# Patient Record
Sex: FEMALE | Race: WHITE | NOT HISPANIC OR LATINO | Employment: OTHER | URBAN - METROPOLITAN AREA
[De-identification: names, ages, dates, MRNs, and addresses within clinical notes are randomized per-mention and may not be internally consistent; named-entity substitution may affect disease eponyms.]

---

## 2017-01-02 DIAGNOSIS — E11.9 CONTROLLED TYPE 2 DIABETES MELLITUS WITHOUT COMPLICATION, UNSPECIFIED LONG TERM INSULIN USE STATUS: Primary | ICD-10-CM

## 2017-01-03 DIAGNOSIS — E11.9 CONTROLLED TYPE 2 DIABETES MELLITUS WITHOUT COMPLICATION, UNSPECIFIED LONG TERM INSULIN USE STATUS: ICD-10-CM

## 2017-01-03 RX ORDER — BLOOD-GLUCOSE METER
KIT MISCELLANEOUS
Qty: 150 EACH | Refills: 11 | Status: SHIPPED | OUTPATIENT
Start: 2017-01-03 | End: 2017-01-03 | Stop reason: SDUPTHER

## 2017-01-03 RX ORDER — BLOOD-GLUCOSE METER
KIT MISCELLANEOUS
Qty: 150 EACH | Refills: 5 | Status: SHIPPED | OUTPATIENT
Start: 2017-01-03 | End: 2018-03-13 | Stop reason: SDUPTHER

## 2017-01-20 ENCOUNTER — TELEPHONE (OUTPATIENT)
Dept: INTERNAL MEDICINE | Facility: CLINIC | Age: 77
End: 2017-01-20

## 2017-01-20 NOTE — TELEPHONE ENCOUNTER
I called Traci Morris to ask if blood sugars returned to normal today and was told by Deborah that Marisa was busy with a patient and that she assumes everything is normal or she would have called back.  Deborah was advised to tell Marisa I called back and if blood sugars continue to run high to call our office back  She voiced understanding

## 2017-01-20 NOTE — TELEPHONE ENCOUNTER
----- Message from Ana Trinidad sent at 1/20/2017 12:01 PM EST -----  Contact: ARTURO [facility]  RE: BLOOD GLUCOSE     ARTURO HAS FAXED WEEKLY FASTING LOG    TODAY, HER BLOOD GLUCOSE  AT LUNCH, SHE RECEIVED 17 UNITS OF HUMALOG BEFORE LUNCH, ARTURO WILL RECHECK AFTER LUNCH.    CALL BACK #[phone]

## 2017-04-21 ENCOUNTER — OFFICE VISIT (OUTPATIENT)
Dept: ENDOCRINOLOGY | Facility: CLINIC | Age: 77
End: 2017-04-21

## 2017-04-21 VITALS
BODY MASS INDEX: 23.21 KG/M2 | OXYGEN SATURATION: 98 % | WEIGHT: 131 LBS | HEIGHT: 63 IN | SYSTOLIC BLOOD PRESSURE: 122 MMHG | DIASTOLIC BLOOD PRESSURE: 64 MMHG | HEART RATE: 90 BPM

## 2017-04-21 DIAGNOSIS — I10 BENIGN ESSENTIAL HYPERTENSION: Chronic | ICD-10-CM

## 2017-04-21 DIAGNOSIS — G30.1 LATE ONSET ALZHEIMER'S DISEASE WITHOUT BEHAVIORAL DISTURBANCE (HCC): ICD-10-CM

## 2017-04-21 DIAGNOSIS — F02.80 LATE ONSET ALZHEIMER'S DISEASE WITHOUT BEHAVIORAL DISTURBANCE (HCC): ICD-10-CM

## 2017-04-21 DIAGNOSIS — IMO0001 UNCONTROLLED DIABETES MELLITUS TYPE 2 WITHOUT COMPLICATIONS, UNSPECIFIED LONG TERM INSULIN USE STATUS: Primary | Chronic | ICD-10-CM

## 2017-04-21 DIAGNOSIS — E78.2 MIXED HYPERLIPIDEMIA: Chronic | ICD-10-CM

## 2017-04-21 LAB
GLUCOSE BLDC GLUCOMTR-MCNC: 286 MG/DL (ref 70–130)
HBA1C MFR BLD: 9.1 %

## 2017-04-21 PROCEDURE — 82947 ASSAY GLUCOSE BLOOD QUANT: CPT | Performed by: INTERNAL MEDICINE

## 2017-04-21 PROCEDURE — 83036 HEMOGLOBIN GLYCOSYLATED A1C: CPT | Performed by: INTERNAL MEDICINE

## 2017-04-21 PROCEDURE — 99214 OFFICE O/P EST MOD 30 MIN: CPT | Performed by: INTERNAL MEDICINE

## 2017-04-21 NOTE — PROGRESS NOTES
Faustino Lopez 77 y.o.  CC:Follow-up; Diabetes (TYpe II); Hypertension; and Memory Loss    [place]: Follow-up; Diabetes (TYpe II); Hypertension; and Memory Loss    Blood sugar and 90 day average sugar reviewed  Results for orders placed or performed in visit on 04/21/17   POC Glycosylated Hemoglobin (Hb A1C)   Result Value Ref Range    Hemoglobin A1C 9.1 %   POC Glucose Fingerstick   Result Value Ref Range    Glucose 286 (A) 70 - 130 mg/dL     Average sugar is higher and 8 pm sugars are high as well   Discussed with daughter- she is not having low sugars  Has advanced dementia- in nursing home  Pp high sugars after dinner are likely causing higher morning and average sugars  No recent eye exam due to being in an institution   Ur alb not done     Allergies   Allergen Reactions   • Erythromycin Hives     All Mycins.  Erythromycin Derivatives.       Current Outpatient Prescriptions:   •  ANTI-DIARRHEAL 2 MG tablet, Take 1 tablet by mouth 3 (Three) Times a Day As Needed., Disp: , Rfl: 5  •  Cholecalciferol (VITAMIN D3) 2000 UNITS tablet, Take 1 tablet by mouth., Disp: , Rfl:   •  Cinnamon 500 MG capsule, Take  by mouth., Disp: , Rfl:   •  Cinnamon 500 MG capsule, Take 2 capsules by mouth 2 (Two) Times a Day., Disp: , Rfl: 6  •  clotrimazole-betamethasone (LOTRISONE) 1-0.05 % cream, , Disp: , Rfl:   •  donepezil (ARICEPT) 10 MG tablet, Take  by mouth., Disp: , Rfl:   •  doxycycline (VIBRAMYCIN) 100 MG capsule, Take  by mouth., Disp: , Rfl:   •  EASY TOUCH LANCETS 30G misc, , Disp: , Rfl:   •  EASYMAX TEST test strip, USE TO TEST BLOOD SUGAR FOUR TIMES A DAY *PHYSICIAN ORDER EXPIRES 2/26/15, Disp: 150 each, Rfl: 5  •  EASYTEST LANCETS misc, Test blood sugar QID or as directed, Disp: 150 each, Rfl: 5  •  glimepiride (AMARYL) 4 MG tablet, Take  by mouth 2 (two) times a day., Disp: , Rfl:   •  glimepiride (AMARYL) 4 MG tablet, Take 4 mg by mouth 2 times daily.  , Disp: , Rfl:   •  HUMALOG 100 UNIT/ML injection, INJECT 10  "UNITS SUBCUTANEOUSLY EVERY DAY, 12 UNITS AT LUNCH, AND 16 UNITS AT BEDTIME +111 USE SLIDING SCALE AT MEALS ONLY, Disp: 10 mL, Rfl: 3  •  HYDROcodone-acetaminophen (NORCO) 5-325 MG per tablet, Take  by mouth., Disp: , Rfl:   •  Insulin Glargine (LANTUS SOLOSTAR) 100 UNIT/ML injection pen, Inject  under the skin., Disp: , Rfl:   •  Insulin Pen Needle (B-D UF III MINI PEN NEEDLES) 31G X 5 MM misc, , Disp: , Rfl:   •  Insulin Pen Needle (EASY TOUCH PEN NEEDLES) 31G X 5 MM misc, , Disp: , Rfl:   •  Insulin Syringe (EASY TOUCH INSULIN SYRINGE) 29G X 1/2\" 1 ML misc, , Disp: , Rfl:   •  lisinopril (PRINIVIL,ZESTRIL) 5 MG tablet, Take  by mouth daily., Disp: , Rfl:   •  loratadine (CLARITIN) 10 MG tablet, Take  by mouth., Disp: , Rfl:   •  memantine (NAMENDA XR) 28 MG capsule sustained-release 24 hr extended release capsule, Take  by mouth daily., Disp: , Rfl:   •  metFORMIN (GLUCOPHAGE) 1000 MG tablet, Take 1 tablet by mouth 2 (Two) Times a Day., Disp: 60 tablet, Rfl: 11  •  Multiple Vitamin tablet, Take  by mouth., Disp: , Rfl:   •  Multiple Vitamins-Minerals (PRESERVISION AREDS 2) capsule, Take  by mouth., Disp: , Rfl:   •  mupirocin (BACTROBAN) 2 % ointment, Apply  topically., Disp: , Rfl:   •  NON-ASPIRIN PAIN RELIEF 325 MG tablet, Take 2 tablets by mouth 4 (Four) Times a Day As Needed., Disp: , Rfl: 5  •  Omega-3 Fatty Acids (FISH OIL) 1000 MG capsule capsule, Take  by mouth., Disp: , Rfl:   •  pioglitazone (ACTOS) 45 MG tablet, , Disp: , Rfl:   •  pseudoephedrine (SUDAFED) 30 MG tablet, Take 30 mg by mouth as needed.  , Disp: , Rfl:   •  raloxifene (EVISTA) 60 MG tablet, Take  by mouth daily., Disp: , Rfl:   •  saccharomyces boulardii (FLORASTOR) 250 MG capsule, Take 1 capsule by mouth daily., Disp: , Rfl:   •  sertraline (ZOLOFT) 50 MG tablet, Take 150mg daily, Disp: 90 tablet, Rfl: 5  Patient Active Problem List    Diagnosis   • History of macular degeneration [Z86.69]   • History of migraine [Z86.69]   • Seasonal " allergies [J30.2]     Overview Note:     13 Apr 2015  on claritin     • Alzheimer's disease [G30.9]     Overview Note:     Impression: 04/25/2016 - stable  Impression: 04/13/2015 - at [facility]  seeing Dr Evans  Impression: 06/04/2014 - jose rafael 5/14;      • Benign colonic polyp [K63.5]   • Benign essential hypertension [I10]     Overview Note:     Impression: 04/25/2016 - bp is good  Impression: 11/19/2015 - bp is good  Impression: 01/07/2015 - bp is good  Impression: 07/03/2014 - bp is high- add amlodipine 5 mg daily;      • Chronic lymphocytic leukemia [C91.10]     Overview Note:     Impression: 04/13/2015 - check cbc;      • Depression [F32.9]   • Uncontrolled type 2 diabetes mellitus [E11.65]     Overview Note:     Impression: 04/25/2016 - check cmp on metformin   hgn a1c 8.4%   no change recommended   occ low- is holding am insulin    ok but will update [facility]  Impression: 11/19/2015 - blood sugar is 260 now pp   hgn a1c 7.6%  is up to date with foot care   we are reviewing sugars every 2-3 weeks   and adjusting her insulin  Impression: 04/13/2015 - blood sugar is 118.72, hgn a1c is 9.2% (average 210)  is getting blood sugar testing 4 times daily  we just raised humalog   no neuropathy  checking 4 times a day at NH  change order to be before meals and at bedtime  ok podiatry to see  Impression: 01/07/2015 - blood sugar and 90 day average sugar are higher.  check blood sugars ac and hs and ssi written   better blood sugars overall with titration of long acting insulin   added back up ssi prn ac only- no hs coverage  discussed blood sugar 330 and hgn a1c 9.5%  f/u 3-4 months  Impression: 07/03/2014 - blood sugar 279 after cereal, discussed adding protein to each meal, hgn a1c 8.2% (improved) - average 180  is up to date with eye exam, no neuropathy and neg ur alb.  update [facility]  Impression: 04/28/2014 - blood sugar is 196, hgn a1c 9.1% (average 210)  eye exam shows macular degeneration  no neuropathy  ur  alb neg - reviewed outside lab work with normal creatinine.  discussed options for treatment and would like for her to d/c actos, take metformin 1000 mg +amaryl 4 mg twice daily and add lantus 10 u daily to regimen    4/25/2016:  blood sugar 220 and hgn a1c 8.4%   bp is good   appetite good but not getting am insulin - is sleeping til noon and was having low sugars with am insulin   has several skin lesions right shoulder and wrist- neurodermatitis   memory is stable   discussed blood sugars and average sugar, goals based on cognitive dysfunction and currently I feel like blood sugar is in acceptable range in light of this   rare low sugar    she will continue to follow diet and work on weight loss. check blood sugars fasting and before dinner, email these weekly.  f/u 8 weeks.;      • Diarrhea [R19.7]     Overview Note:     Impression: 07/03/2014 - refer GI  use immodium  some incontinence, nocturnal diarrhea;      • Hyperlipidemia [E78.5]     Overview Note:     Impression: 11/19/2015 - update 4/16 - annually  has had flu shot   daughter will check on pneumonia shot - recc discussed  Impression: 04/13/2015 - check flp  Impression: 07/03/2014 - check flp  Impression: 04/28/2014 - check next ov.;      • Irritable bowel syndrome [K58.9]     Overview Note:     Impression: 07/03/2014 - refer to GI, some cramping and incontinence - not sure when last colonoscopy was, h/o food allergies  check celiac titers, refer to GI;    03 Jul 2014  refer to GI, some cramping and incontinence - not sure      when last colonoscopy was, h/o food allergiescheck celiac titers, refer to GI  Kesha Waller (Endocrinology)     • Cramps of lower extremity [R25.2]   • Leukocytosis [D72.829]     Overview Note:     Impression: 04/13/2015 - check cbc;      • Memory loss [R41.3]     Overview Note:     Loss of memory     • Neurodermatitis [L28.0]     Overview Note:     Impression: 04/25/2016 - NH is watching- may be due to CLL   keep  covered, neosporin prn;      • Seasonal allergic rhinitis [J30.2]     Overview Note:     Impression: 04/13/2015 - on claritin;      • Vitamin D deficiency [E55.9]     Overview Note:     Impression: 04/13/2015 - update vitamin D levels  Impression: 07/03/2014 - update level;      • Fibrocystic breast changes [N60.19]   • Encounter for screening mammogram for malignant neoplasm of breast [Z12.31]   • Mammographic microcalcification [R92.0]     Review of Systems   Constitutional: Negative for activity change, appetite change, chills, diaphoresis, fatigue, fever and unexpected weight change.   HENT: Negative for congestion, dental problem, drooling, ear discharge, ear pain, facial swelling, hearing loss, mouth sores, nosebleeds, postnasal drip, rhinorrhea, sinus pressure, sneezing, sore throat, tinnitus, trouble swallowing and voice change.    Eyes: Negative for photophobia, pain, discharge, redness, itching and visual disturbance.   Respiratory: Negative for apnea, cough, choking, chest tightness, shortness of breath, wheezing and stridor.    Cardiovascular: Negative for chest pain, palpitations and leg swelling.   Gastrointestinal: Negative for abdominal distention, abdominal pain, anal bleeding, blood in stool, constipation, diarrhea, nausea, rectal pain and vomiting.   Endocrine: Negative for cold intolerance, heat intolerance, polydipsia, polyphagia and polyuria.   Genitourinary: Negative for decreased urine volume, difficulty urinating, dysuria, enuresis, flank pain, frequency, genital sores, hematuria and urgency.   Musculoskeletal: Negative for arthralgias, back pain, gait problem, joint swelling, myalgias, neck pain and neck stiffness.   Skin: Negative for color change, pallor, rash and wound.   Allergic/Immunologic: Negative for environmental allergies, food allergies and immunocompromised state.   Neurological: Negative for dizziness, tremors, seizures, syncope, facial asymmetry, speech difficulty, weakness,  "light-headedness, numbness and headaches.   Hematological: Negative for adenopathy. Does not bruise/bleed easily.   Psychiatric/Behavioral: Positive for confusion. Negative for agitation, behavioral problems, decreased concentration, dysphoric mood, hallucinations, self-injury, sleep disturbance and suicidal ideas. The patient is not nervous/anxious and is not hyperactive.         Memory loss     Social History     Social History   • Marital status:      Spouse name: N/A   • Number of children: N/A   • Years of education: N/A     Occupational History   • Not on file.     Social History Main Topics   • Smoking status: Never Smoker   • Smokeless tobacco: Not on file   • Alcohol use No   • Drug use: No   • Sexual activity: Defer     Other Topics Concern   • Not on file     Social History Narrative     Family History   Problem Relation Age of Onset   • Diabetes Father    • Migraines Father      Migraine headaches   • Diabetes Daughter    • Hyperlipidemia Daughter      /64  Pulse 90  Ht 63\" (160 cm)  Wt 131 lb (59.4 kg)  SpO2 98%  BMI 23.21 kg/m2  Physical Exam   Constitutional: She is oriented to person, place, and time. She appears well-developed and well-nourished.   HENT:   Head: Normocephalic and atraumatic.   Nose: Nose normal.   Mouth/Throat: Oropharynx is clear and moist.   Eyes: Conjunctivae, EOM and lids are normal. Pupils are equal, round, and reactive to light.   Neck: Trachea normal and normal range of motion. Neck supple. Carotid bruit is not present. No tracheal deviation present. No thyroid mass and no thyromegaly present.   Cardiovascular: Normal rate, regular rhythm, normal heart sounds and intact distal pulses.  Exam reveals no gallop and no friction rub.    No murmur heard.  Pulmonary/Chest: Effort normal and breath sounds normal. No respiratory distress. She has no wheezes.   Musculoskeletal: Normal range of motion. She exhibits no edema or deformity.       Neurological Sensory " Findings - Unaltered hot/cold right ankle/foot discrimination and unaltered hot/cold left ankle/foot discrimination. Unaltered sharp/dull right ankle/foot discrimination and unaltered sharp/dull left ankle/foot discrimination. No right ankle/foot altered proprioception and no left ankle/foot altered proprioception    Vascular Status -  Her exam exhibits right foot vasculature normal. Her exam exhibits no right foot edema. Her exam exhibits left foot vasculature normal. Her exam exhibits no left foot edema.   Skin Integrity  -  Her right foot skin is intact.     Faustino 's left foot skin is intact. .  Lymphadenopathy:     She has no cervical adenopathy.   Neurological: She is alert and oriented to person, place, and time. She has normal reflexes. She displays normal reflexes. No cranial nerve deficit.   Skin: Skin is warm and dry. No rash noted. No cyanosis or erythema. Nails show no clubbing.   Psychiatric: She has a normal mood and affect. Her speech is normal and behavior is normal. Judgment and thought content normal. Cognition and memory are normal.   Nursing note and vitals reviewed.    Results for orders placed or performed in visit on 10/17/16   POC Glucose Fingerstick   Result Value Ref Range    Glucose 305 (A) 70 - 130 mg/dL   POC Glycosylated Hemoglobin (Hb A1C)   Result Value Ref Range    Hemoglobin A1C 8.5 %     Faustino was seen today for follow-up, diabetes, hypertension and memory loss.    Diagnoses and all orders for this visit:    Uncontrolled diabetes mellitus type 2 without complications, unspecified long term insulin use status  -     POC Glycosylated Hemoglobin (Hb A1C)  -     POC Glucose Fingerstick      Problem List Items Addressed This Visit        Cardiovascular and Mediastinum    Benign essential hypertension (Chronic)     bp is good  No changes- on lisinopril  Check lab next ov          Hyperlipidemia (Chronic)     Update lipids at next visit            Endocrine    Uncontrolled type 2  diabetes mellitus - Primary (Chronic)     Blood sugar and 90 day average sugar reviewed  Results for orders placed or performed in visit on 04/21/17   POC Glycosylated Hemoglobin (Hb A1C)   Result Value Ref Range    Hemoglobin A1C 9.1 %   POC Glucose Fingerstick   Result Value Ref Range    Glucose 286 (A) 70 - 130 mg/dL     Average sugar is higher 210 or so  She is not up to date with preventive care- is living in nursing home setting  Goal a1c 7-8 %   Based on higher pp dinner sugars, would increase predinner humalog to 22 units  email sugars weekly   Continue testing ac and hs         Relevant Orders    POC Glycosylated Hemoglobin (Hb A1C) (Completed)    POC Glucose Fingerstick (Completed)       Nervous and Auditory    Late onset Alzheimer's disease without behavioral disturbance     Stable symptoms currently              Return in about 4 months (around 8/21/2017) for Recheck 30 min .    Maryam Steele MA

## 2017-05-05 RX ORDER — SYRINGE,NEEDLE,INSULN,SAFE,1ML 29 G X1/2"
SYRINGE, EMPTY DISPOSABLE MISCELLANEOUS
Qty: 100 EACH | Refills: 5 | Status: SHIPPED | OUTPATIENT
Start: 2017-05-05 | End: 2018-05-22 | Stop reason: SDUPTHER

## 2017-05-09 RX ORDER — MEMANTINE HYDROCHLORIDE 28 MG/1
CAPSULE, EXTENDED RELEASE ORAL
Qty: 30 CAPSULE | Refills: 6 | Status: SHIPPED | OUTPATIENT
Start: 2017-05-09 | End: 2017-06-30

## 2017-06-02 RX ORDER — LANCETS 30 GAUGE
EACH MISCELLANEOUS
Qty: 150 EACH | Refills: 5 | Status: SHIPPED | OUTPATIENT
Start: 2017-06-02 | End: 2017-06-05 | Stop reason: SDUPTHER

## 2017-06-05 RX ORDER — LANCETS 30 GAUGE
EACH MISCELLANEOUS
Qty: 100 EACH | Refills: 5 | Status: ON HOLD | OUTPATIENT
Start: 2017-06-05 | End: 2019-03-28

## 2017-06-06 RX ORDER — SERTRALINE HYDROCHLORIDE 100 MG/1
TABLET, FILM COATED ORAL
Qty: 45 TABLET | Refills: 5 | Status: SHIPPED | OUTPATIENT
Start: 2017-06-06 | End: 2018-01-16 | Stop reason: SDUPTHER

## 2017-06-29 ENCOUNTER — OFFICE VISIT (OUTPATIENT)
Dept: NEUROLOGY | Facility: CLINIC | Age: 77
End: 2017-06-29

## 2017-06-29 VITALS
WEIGHT: 131 LBS | SYSTOLIC BLOOD PRESSURE: 138 MMHG | HEIGHT: 63 IN | BODY MASS INDEX: 23.21 KG/M2 | DIASTOLIC BLOOD PRESSURE: 82 MMHG

## 2017-06-29 DIAGNOSIS — F02.80 LATE ONSET ALZHEIMER'S DISEASE WITHOUT BEHAVIORAL DISTURBANCE (HCC): Primary | ICD-10-CM

## 2017-06-29 DIAGNOSIS — G30.1 LATE ONSET ALZHEIMER'S DISEASE WITHOUT BEHAVIORAL DISTURBANCE (HCC): Primary | ICD-10-CM

## 2017-06-29 PROCEDURE — 99214 OFFICE O/P EST MOD 30 MIN: CPT | Performed by: PHYSICIAN ASSISTANT

## 2017-06-29 NOTE — PROGRESS NOTES
Subjective     History of Present Illness   Pt originally seen 6/3/14 for memory loss of about a year. Not noticed by patient. Dtr reported trouble with judgment, short term memory asking repetitive questions and making repetitive statements. Forgetting to pay bills --dtr had to take over Lost a valuable check and didn't remember she'd received it. Also more trouble with learning how to use new appliances, organizing and planning, taking meds correctly (dtr now supervising meds, particularly insulin). Trouble finding her way back to table at a restaurant. Doesn't cook much -- eats out. Uses microwave.  Personality more withdrawn.    Normal B12, folate, TFTs, CT head. Started donepezil, then further worsening, had to stop driving, added Namenda and Lexapro. Noted need for 24 hr supervision and moved to [facility].  Nan reported dhe feels essentially unchanged cognitively and her daughter agreed. Her daughter continues to note that she is picking at her skin. She has also developed urinary and fecal incontinence. On 10mg daily, Namenda, and Lexapro 10mg daily. MMSE was 18/30, maybe influenced by visual impairment 2/2 macular degeneration. Planned increase of sertraline for picking at sores.   Today dtr notes continued problems with memory, judgment, WF, planning/organizing and orientation, but no difficulty with delusions or hallucinations. Also notes pt having more difficulty walking, more shuffling, and balance not as good. Pt reports feeling fine.  Picking at sores again -- increased dose worked well until past month or two. Sleeping more past few months. Continued problems with bowel incontinence. Recent check of UA. Appetite OK. Had skin biopsies. No longer getting mammograms.    Today: She feels essentially unchanged since she was last seen in 12/17. Her family notes a continued cognitive and physical decline. Her balance has become increasingly impaired.       The following portions of the patient's history  "were reviewed and updated as appropriate: allergies, current medications, past family history, past medical history, past social history, past surgical history and problem list.    Review of Systems   Constitutional: Negative.    HENT: Negative.    Eyes: Negative.    Respiratory: Negative.    Cardiovascular: Negative.    Gastrointestinal: Negative.    Endocrine: Negative.    Genitourinary: Negative.    Musculoskeletal: Negative.    Skin: Negative.    Allergic/Immunologic: Negative.    Neurological:        As noted in HPI   Hematological: Negative.    Psychiatric/Behavioral: Negative.        Objective     /82  Ht 63\" (160 cm)  Wt 131 lb (59.4 kg)  BMI 23.21 kg/m2    General appearance today is normal.         Physical Exam   Neurological: She has normal strength. She has a normal Finger-Nose-Finger Test.   Psychiatric: Her speech is normal.        Neurologic Exam     Mental Status   Oriented to person.   (Oriented to state, city, and floor  )  Disoriented to time. Disoriented to year, month, date, day and season.   Registration: recalls 3 of 3 objects. Recall of objects at 5 minutes: 0/3 recall. Follows 3 step commands.   Attention: 1/5 sequencing.   Speech: speech is normal   Level of consciousness: alert  Able to name object. Able to read. Unable to repeat. Able to write. Normal comprehension.     Cranial Nerves   Cranial nerves II through XII intact.     Motor Exam   Muscle bulk: normal  Overall muscle tone: normal    Strength   Strength 5/5 throughout.     Sensory Exam   Light touch normal.     Gait, Coordination, and Reflexes     Gait  Gait: (wide based)    Coordination   Finger to nose coordination: normal    Tremor   Resting tremor: absent        Results  MMSE=13 (!6 in 12/16)      Assessment/Plan   Faustino was seen today for dementia.    Diagnoses and all orders for this visit:    Late onset Alzheimer's disease without behavioral disturbance          Discussion/Summary   Faustino Lopez returns to " clinic today for evaluation of Alzheimer's Disease . I again reviewed her current status and treatment options. After discussing potential treatment options, it was elected to continue on  memantine and Zoloft unchanged for now. She will then follow up in 6 months, or sooner if needed.       I spent 20 minutes out of 25 minutes face to face with the patient and family and discussing diagnosis, prognosis, evaluation, current status, treatment options and management.    As part of this visit I obtained additional history from the family which is incorporated in the HPI.      Kellen Sandy PA-C

## 2017-06-30 RX ORDER — MEMANTINE HYDROCHLORIDE 10 MG/1
10 TABLET ORAL 2 TIMES DAILY
Qty: 60 TABLET | Refills: 11 | Status: SHIPPED | OUTPATIENT
Start: 2017-06-30 | End: 2018-06-05 | Stop reason: SDUPTHER

## 2017-07-07 ENCOUNTER — TELEPHONE (OUTPATIENT)
Dept: NEUROLOGY | Facility: CLINIC | Age: 77
End: 2017-07-07

## 2017-07-07 NOTE — TELEPHONE ENCOUNTER
It was most likely due to cost. I am not opposed to her being the Namenda XR if the cost is the same as the generic (10mg BID).

## 2017-07-07 NOTE — TELEPHONE ENCOUNTER
Pharmacy called  and wanted to double check medication.  States pt has been taking the Namenda XR and now the 10 mg was called in. They wanted to check which one you wanted PT should be currently taking and if the 10mg why the change? Please advise       [phone]

## 2017-08-18 ENCOUNTER — OFFICE VISIT (OUTPATIENT)
Dept: ENDOCRINOLOGY | Facility: CLINIC | Age: 77
End: 2017-08-18

## 2017-08-18 VITALS
OXYGEN SATURATION: 94 % | SYSTOLIC BLOOD PRESSURE: 118 MMHG | HEIGHT: 63 IN | WEIGHT: 131 LBS | DIASTOLIC BLOOD PRESSURE: 60 MMHG | HEART RATE: 85 BPM | BODY MASS INDEX: 23.21 KG/M2

## 2017-08-18 DIAGNOSIS — IMO0001 UNCONTROLLED DIABETES MELLITUS TYPE 2 WITHOUT COMPLICATIONS, UNSPECIFIED LONG TERM INSULIN USE STATUS: Primary | Chronic | ICD-10-CM

## 2017-08-18 DIAGNOSIS — E78.2 MIXED HYPERLIPIDEMIA: Chronic | ICD-10-CM

## 2017-08-18 DIAGNOSIS — I10 BENIGN ESSENTIAL HYPERTENSION: Chronic | ICD-10-CM

## 2017-08-18 DIAGNOSIS — E03.9 ACQUIRED HYPOTHYROIDISM: ICD-10-CM

## 2017-08-18 DIAGNOSIS — E55.9 VITAMIN D DEFICIENCY: ICD-10-CM

## 2017-08-18 DIAGNOSIS — C91.10 CHRONIC LYMPHOCYTIC LEUKEMIA (HCC): Chronic | ICD-10-CM

## 2017-08-18 LAB
GLUCOSE BLDC GLUCOMTR-MCNC: 323 MG/DL (ref 70–130)
HBA1C MFR BLD: 8.7 %

## 2017-08-18 PROCEDURE — 83036 HEMOGLOBIN GLYCOSYLATED A1C: CPT | Performed by: INTERNAL MEDICINE

## 2017-08-18 PROCEDURE — 99214 OFFICE O/P EST MOD 30 MIN: CPT | Performed by: INTERNAL MEDICINE

## 2017-08-18 PROCEDURE — 82947 ASSAY GLUCOSE BLOOD QUANT: CPT | Performed by: INTERNAL MEDICINE

## 2017-08-18 RX ORDER — PEN NEEDLE, DIABETIC, SAFETY 30 GX3/16"
NEEDLE, DISPOSABLE MISCELLANEOUS
Refills: 0 | COMMUNITY
Start: 2017-06-07

## 2017-08-18 RX ORDER — MEMANTINE HYDROCHLORIDE 28 MG/1
CAPSULE, EXTENDED RELEASE ORAL
Refills: 7 | COMMUNITY
Start: 2017-07-11 | End: 2019-03-29 | Stop reason: HOSPADM

## 2017-08-18 RX ORDER — LEVOTHYROXINE SODIUM 0.03 MG/1
TABLET ORAL
COMMUNITY
Start: 2017-07-31

## 2017-08-18 RX ORDER — ATORVASTATIN CALCIUM 40 MG/1
TABLET, FILM COATED ORAL
Refills: 6 | COMMUNITY
Start: 2017-07-26 | End: 2017-08-18

## 2017-08-18 NOTE — ASSESSMENT & PLAN NOTE
Nursing home started supplement for tsh 5.9  Discussed that this is actually normal for her age   Recommended repeat tfts on supplement  Send copy here if done

## 2017-08-18 NOTE — PROGRESS NOTES
Faustino Lpoez 77 y.o.  [facility]: Follow-up; Diabetes (Type II, last eye exam was 2 years ago); Hypertension; Memory Loss; and Hypothyroidism (Diagnosed by Nursing Facility and levothyroxine was started January 2017)      [place]: Follow-up; Diabetes (Type II, last eye exam was 2 years ago); Hypertension; Memory Loss; and Hypothyroidism (Diagnosed by Nursing Facility and levothyroxine was started January 2017)    Blood sugar and 90 day average sugar reviewed  Results for orders placed or performed in visit on 08/18/17   POC Glycosylated Hemoglobin (Hb A1C)   Result Value Ref Range    Hemoglobin A1C 8.7 %   POC Glucose Fingerstick   Result Value Ref Range    Glucose 323 (A) 70 - 130 mg/dL     Average sugar is higher - is on amaryl, metformin, humalog and lantus  Nursing home is emailing sugars  Higher sugars assoc with higher carb foods, sweet tea  She is on diabetic diet but aids have problems with using splenda / remembering to give her diet drinks  A lot of transition   Fasting sugar   Pp breakfast 79- 498  Pre lunch   Pre dinner 200- 407 -recheck 290  Discussed with daughter- primary goal with cognitive dysfunction is avoiding low sugars  High wbc - has cll   Also reviewed outside lab work 12/16- tsh high (added thyroid supplement due to tsh of 5.9)  Also NH started statin therapy due to high chol    Allergies   Allergen Reactions   • Erythromycin Hives     All Mycins.  Erythromycin Derivatives.       Current Outpatient Prescriptions:   •  Cholecalciferol (VITAMIN D3) 2000 UNITS tablet, Take 1 tablet by mouth., Disp: , Rfl:   •  Cinnamon 500 MG capsule, Take 2 capsules by mouth 2 (Two) Times a Day., Disp: , Rfl: 6  •  donepezil (ARICEPT) 10 MG tablet, Take  by mouth., Disp: , Rfl:   •  glimepiride (AMARYL) 4 MG tablet, Take  by mouth 2 (two) times a day., Disp: , Rfl:   •  HYDROcodone-acetaminophen (NORCO) 5-325 MG per tablet, Take  by mouth., Disp: , Rfl:   •  lisinopril (PRINIVIL,ZESTRIL) 5 MG tablet,  "Take  by mouth daily., Disp: , Rfl:   •  loratadine (CLARITIN) 10 MG tablet, Take  by mouth., Disp: , Rfl:   •  memantine (NAMENDA) 10 MG tablet, Take 1 tablet by mouth 2 (Two) Times a Day., Disp: 60 tablet, Rfl: 11  •  metFORMIN (GLUCOPHAGE) 1000 MG tablet, Take 1 tablet by mouth 2 (Two) Times a Day., Disp: 60 tablet, Rfl: 11  •  saccharomyces boulardii (FLORASTOR) 250 MG capsule, Take 1 capsule by mouth daily., Disp: , Rfl:   •  sertraline (ZOLOFT) 100 MG tablet, TAKE 1&1/2 TABLETS BY MOUTH AT BEDTIME, Disp: 45 tablet, Rfl: 5  •  ANTI-DIARRHEAL 2 MG tablet, Take 1 tablet by mouth 3 (Three) Times a Day As Needed., Disp: , Rfl: 5  •  BD AUTOSHIELD DUO 30G X 5 MM misc, , Disp: , Rfl: 0  •  clotrimazole-betamethasone (LOTRISONE) 1-0.05 % cream, , Disp: , Rfl:   •  EASY TOUCH INSULIN SAFETY SYR 29G X 1/2\" 1 ML misc, INJECT WITH INSULIN FOUR TIMES A DAY OR AS DIRECTED, Disp: 100 each, Rfl: 5  •  EASY TOUCH LANCETS 30G misc, USE TO TEST BLOOD SUGAR FOUR TIMES A DAY *PHYSICIAN ORDER EXPIRES 2/24/16*, Disp: 100 each, Rfl: 5  •  EASYMAX TEST test strip, USE TO TEST BLOOD SUGAR FOUR TIMES A DAY *PHYSICIAN ORDER EXPIRES 2/26/15, Disp: 150 each, Rfl: 5  •  EASYTEST LANCETS misc, Test blood sugar QID or as directed, Disp: 150 each, Rfl: 5  •  HUMALOG 100 UNIT/ML injection, INJECT 12U SUBCUTANEOUSLY BEFORE BREAKFAST, 14U BEFORE LUNCH AND 22U BEFORE DINNER *DISCARD 28 DAYS AFTER OPENING* (Patient taking differently: INJECT 14U SUBCUTANEOUSLY BEFORE BREAKFAST, 16U BEFORE LUNCH AND 25U BEFORE DINNER *DISCARD 28 DAYS AFTER OPENING*), Disp: 10 mL, Rfl: 5  •  Insulin Glargine (LANTUS SOLOSTAR) 100 UNIT/ML injection pen, Inject  under the skin., Disp: , Rfl:   •  Insulin Pen Needle (B-D UF III MINI PEN NEEDLES) 31G X 5 MM misc, , Disp: , Rfl:   •  Insulin Pen Needle (EASY TOUCH PEN NEEDLES) 31G X 5 MM misc, , Disp: , Rfl:   •  levothyroxine (SYNTHROID, LEVOTHROID) 25 MCG tablet, , Disp: , Rfl:   •  Multiple Vitamin tablet, Take  by " mouth., Disp: , Rfl:   •  Multiple Vitamins-Minerals (PRESERVISION AREDS 2) capsule, Take  by mouth., Disp: , Rfl:   •  NAMENDA XR 28 MG capsule sustained-release 24 hr extended release capsule, , Disp: , Rfl: 7  •  NON-ASPIRIN PAIN RELIEF 325 MG tablet, Take 2 tablets by mouth 4 (Four) Times a Day As Needed., Disp: , Rfl: 5  •  Omega-3 Fatty Acids (FISH OIL) 1000 MG capsule capsule, Take  by mouth., Disp: , Rfl:   •  pseudoephedrine (SUDAFED) 30 MG tablet, Take 30 mg by mouth as needed.  , Disp: , Rfl:   Patient Active Problem List    Diagnosis   • History of macular degeneration [Z86.69]   • History of migraine [Z86.69]   • Seasonal allergies [J30.2]     Overview Note:     13 Apr 2015  on claritin     • Late onset Alzheimer's disease without behavioral disturbance [G30.1, F02.80]     Overview Note:     Impression: 04/25/2016 - stable  Impression: 04/13/2015 - at [facility]  seeing Dr Evans  Impression: 06/04/2014 - jose rafael 5/14;      • Benign colonic polyp [K63.5]   • Benign essential hypertension [I10]     Overview Note:     Impression: 04/25/2016 - bp is good  Impression: 11/19/2015 - bp is good  Impression: 01/07/2015 - bp is good  Impression: 07/03/2014 - bp is high- add amlodipine 5 mg daily;      • Chronic lymphocytic leukemia [C91.10]     Overview Note:     Impression: 04/13/2015 - check cbc;      • Depression [F32.9]   • Uncontrolled type 2 diabetes mellitus [E11.65]     Overview Note:     Impression: 04/25/2016 - check cmp on metformin   hgn a1c 8.4%   no change recommended   occ low- is holding am insulin    ok but will update cmp  Impression: 11/19/2015 - blood sugar is 260 now pp   hgn a1c 7.6%  is up to date with foot care   we are reviewing sugars every 2-3 weeks   and adjusting her insulin  Impression: 04/13/2015 - blood sugar is 118.72, hgn a1c is 9.2% (average 210)  is getting blood sugar testing 4 times daily  we just raised humalog   no neuropathy  checking 4 times a day at NH  change  order to be before meals and at bedtime  ok podiatry to see  Impression: 01/07/2015 - blood sugar and 90 day average sugar are higher.  check blood sugars ac and hs and ssi written   better blood sugars overall with titration of long acting insulin   added back up ssi prn ac only- no hs coverage  discussed blood sugar 330 and hgn a1c 9.5%  f/u 3-4 months  Impression: 07/03/2014 - blood sugar 279 after cereal, discussed adding protein to each meal, hgn a1c 8.2% (improved) - average 180  is up to date with eye exam, no neuropathy and neg ur alb.  update cmp  Impression: 04/28/2014 - blood sugar is 196, hgn a1c 9.1% (average 210)  eye exam shows macular degeneration  no neuropathy  ur alb neg - reviewed outside lab work with normal creatinine.  discussed options for treatment and would like for her to d/c actos, take metformin 1000 mg +amaryl 4 mg twice daily and add lantus 10 u daily to regimen    4/25/2016:  blood sugar 220 and hgn a1c 8.4%   bp is good   appetite good but not getting am insulin - is sleeping til noon and was having low sugars with am insulin   has several skin lesions right shoulder and wrist- neurodermatitis   memory is stable   discussed blood sugars and average sugar, goals based on cognitive dysfunction and currently I feel like blood sugar is in acceptable range in light of this   rare low sugar    she will continue to follow diet and work on weight loss. check blood sugars fasting and before dinner, email these weekly.  f/u 8 weeks.;      • Diarrhea [R19.7]     Overview Note:     Impression: 07/03/2014 - refer GI  use immodium  some incontinence, nocturnal diarrhea;      • Hyperlipidemia [E78.5]     Overview Note:     Impression: 11/19/2015 - update 4/16 - annually  has had flu shot   daughter will check on pneumonia shot - recc discussed  Impression: 04/13/2015 - check flp  Impression: 07/03/2014 - check flp  Impression: 04/28/2014 - check next ov.;      • Irritable bowel syndrome [K58.9]      Overview Note:     Impression: 07/03/2014 - refer to GI, some cramping and incontinence - not sure when last colonoscopy was, h/o food allergies  check celiac titers, refer to GI;    03 Jul 2014  refer to GI, some cramping and incontinence - not sure      when last colonoscopy was, h/o food allergiescheck celiac titers, refer to GI  Kesha Waller (Endocrinology)     • Cramps of lower extremity [R25.2]   • Leukocytosis [D72.829]     Overview Note:     Impression: 04/13/2015 - check cbc;      • Memory loss [R41.3]     Overview Note:     Loss of memory     • Neurodermatitis [L28.0]     Overview Note:     Impression: 04/25/2016 - NH is watching- may be due to CLL   keep covered, neosporin prn;      • Seasonal allergic rhinitis [J30.2]     Overview Note:     Impression: 04/13/2015 - on claritin;      • Vitamin D deficiency [E55.9]     Overview Note:     Impression: 04/13/2015 - update vitamin D levels  Impression: 07/03/2014 - update level;      • Fibrocystic breast changes [N60.19]   • Encounter for screening mammogram for malignant neoplasm of breast [Z12.31]   • Mammographic microcalcification [R92.0]   • Other specified postprocedural states [Z98.890]     Review of Systems   Constitutional: Negative for activity change, appetite change, chills, diaphoresis, fatigue, fever and unexpected weight change.   HENT: Negative for congestion, dental problem, drooling, ear discharge, ear pain, facial swelling, hearing loss, mouth sores, nosebleeds, postnasal drip, rhinorrhea, sinus pressure, sneezing, sore throat, tinnitus, trouble swallowing and voice change.    Eyes: Negative for photophobia, pain, discharge, redness, itching and visual disturbance.   Respiratory: Negative for apnea, cough, choking, chest tightness, shortness of breath, wheezing and stridor.    Cardiovascular: Negative for chest pain, palpitations and leg swelling.   Gastrointestinal: Negative for abdominal distention, abdominal pain, anal  "bleeding, blood in stool, constipation, diarrhea, nausea, rectal pain and vomiting.   Endocrine: Negative for cold intolerance, heat intolerance, polydipsia, polyphagia and polyuria.   Genitourinary: Negative for decreased urine volume, difficulty urinating, dysuria, enuresis, flank pain, frequency, genital sores, hematuria and urgency.   Musculoskeletal: Negative for arthralgias, back pain, gait problem, joint swelling, myalgias, neck pain and neck stiffness.   Skin: Negative for color change, pallor, rash and wound.   Allergic/Immunologic: Negative for environmental allergies, food allergies and immunocompromised state.   Neurological: Negative for dizziness, tremors, seizures, syncope, facial asymmetry, speech difficulty, weakness, light-headedness, numbness and headaches.   Hematological: Negative for adenopathy. Does not bruise/bleed easily.   Psychiatric/Behavioral: Negative for agitation, behavioral problems, confusion, decreased concentration, dysphoric mood, hallucinations, self-injury, sleep disturbance and suicidal ideas. The patient is not nervous/anxious and is not hyperactive.      Social History     Social History   • Marital status:      Spouse name: N/A   • Number of children: N/A   • Years of education: N/A     Occupational History   • Not on file.     Social History Main Topics   • Smoking status: Never Smoker   • Smokeless tobacco: Not on file   • Alcohol use No   • Drug use: No   • Sexual activity: Defer     Other Topics Concern   • Not on file     Social History Narrative     Family History   Problem Relation Age of Onset   • Diabetes Father    • Migraines Father      Migraine headaches   • Diabetes Daughter    • Hyperlipidemia Daughter      /60  Pulse 85  Ht 63\" (160 cm)  Wt 131 lb (59.4 kg)  SpO2 94%  BMI 23.21 kg/m2  Physical Exam   Constitutional: She is oriented to person, place, and time. She appears well-developed and well-nourished.   HENT:   Head: Normocephalic and " atraumatic.   Nose: Nose normal.   Mouth/Throat: Oropharynx is clear and moist.   Eyes: Conjunctivae, EOM and lids are normal. Pupils are equal, round, and reactive to light.   Neck: Trachea normal and normal range of motion. Neck supple. Carotid bruit is not present. No tracheal deviation present. No thyroid mass and no thyromegaly present.   Cardiovascular: Normal rate, regular rhythm, normal heart sounds and intact distal pulses.  Exam reveals no gallop and no friction rub.    No murmur heard.  Pulmonary/Chest: Effort normal and breath sounds normal. No respiratory distress. She has no wheezes.   Musculoskeletal: Normal range of motion. She exhibits no edema or deformity.   Lymphadenopathy:     She has no cervical adenopathy.   Neurological: She is alert and oriented to person, place, and time. She has normal reflexes. She displays normal reflexes. No cranial nerve deficit.   Skin: Skin is warm and dry. No rash noted. No cyanosis or erythema. Nails show no clubbing.   Psychiatric: She has a normal mood and affect. Her speech is normal and behavior is normal. Judgment and thought content normal. Cognition and memory are normal.   Nursing note and vitals reviewed.    Results for orders placed or performed in visit on 04/21/17   POC Glycosylated Hemoglobin (Hb A1C)   Result Value Ref Range    Hemoglobin A1C 9.1 %   POC Glucose Fingerstick   Result Value Ref Range    Glucose 286 (A) 70 - 130 mg/dL     Faustino was seen today for follow-up, diabetes, hypertension and memory loss.    Diagnoses and all orders for this visit:    Uncontrolled diabetes mellitus type 2 without complications, unspecified long term insulin use status  -     POC Glycosylated Hemoglobin (Hb A1C)  -     POC Glucose Fingerstick      Problem List Items Addressed This Visit        Cardiovascular and Mediastinum    Benign essential hypertension (Chronic)     bp is good          Hyperlipidemia (Chronic)     LDL high- is getting statin therapy - lab  work via MD to you             Digestive    Vitamin D deficiency     Continue supplement             Endocrine    Uncontrolled type 2 diabetes mellitus - Primary (Chronic)     Blood sugar and 90 day average sugar reviewed  Results for orders placed or performed in visit on 08/18/17   POC Glycosylated Hemoglobin (Hb A1C)   Result Value Ref Range    Hemoglobin A1C 8.7 %   POC Glucose Fingerstick   Result Value Ref Range    Glucose 323 (A) 70 - 130 mg/dL     Reminded about eye exam but no vision changes  Is at [facility] now with moderate dementia  No foot lesion- nails trimmed x 10   Recommended podiatry at [facility] to maintain foot care  Higher sugars assoc with ice cream and sweet tea- daughter is working with nurses and nurses aids at [facility] to avoid giving her these items when possible  F/u 4-6 months            Relevant Orders    POC Glycosylated Hemoglobin (Hb A1C) (Completed)    POC Glucose Fingerstick (Completed)    Acquired hypothyroidism     Nursing home started supplement for tsh 5.9  Discussed that this is actually normal for her age   Recommended repeat tfts on supplement  Send copy here if done          Relevant Medications    levothyroxine (SYNTHROID, LEVOTHROID) 25 MCG tablet       Hematopoietic and Hemostatic    Chronic lymphocytic leukemia (Chronic)     Wbc 17 k 12/16- reviewed lab work with her              Return in about 6 months (around 2/18/2018) for Recheck 30 min .    Maryam Steele MA  Signed Kesha Waller MD

## 2017-08-18 NOTE — ASSESSMENT & PLAN NOTE
Blood sugar and 90 day average sugar reviewed  Results for orders placed or performed in visit on 08/18/17   POC Glycosylated Hemoglobin (Hb A1C)   Result Value Ref Range    Hemoglobin A1C 8.7 %   POC Glucose Fingerstick   Result Value Ref Range    Glucose 323 (A) 70 - 130 mg/dL     Reminded about eye exam but no vision changes  [facility] now with moderate dementia  No foot lesion- nails trimmed x 10   Recommended podiatry at [facility] to maintain foot care  Higher sugars assoc with ice cream and sweet tea- daughter is working with nurses and nurses aids at [facility] to avoid giving her these items when possible  F/u 4-6 months

## 2017-10-11 ENCOUNTER — TELEPHONE (OUTPATIENT)
Dept: NEUROLOGY | Facility: CLINIC | Age: 77
End: 2017-10-11

## 2017-10-11 NOTE — TELEPHONE ENCOUNTER
"Daughter called and is trying to manage patient's finances but some financial institutions will not accept her POA without a physician statement that patient is 'Incapacitated' and unable to manage her financial affairs.If you believe this is so, statement needs to state the following \"to whom it may concern\", patient diagnosis and statement if you feel she is unable to manage her healthcare and financial affairs due to this diagnosis. Thank you!    "

## 2017-10-12 ENCOUNTER — TELEPHONE (OUTPATIENT)
Dept: NEUROLOGY | Facility: CLINIC | Age: 77
End: 2017-10-12

## 2017-10-12 NOTE — TELEPHONE ENCOUNTER
LM that Dr. Evans wrote a letter of statement regarding her capacity to manage financial affairs.   
stable

## 2017-12-13 ENCOUNTER — HOSPITAL ENCOUNTER (EMERGENCY)
Facility: HOSPITAL | Age: 77
Discharge: HOME | End: 2017-12-13
Attending: EMERGENCY MEDICINE | Admitting: EMERGENCY MEDICINE

## 2017-12-13 VITALS
WEIGHT: 135 LBS | BODY MASS INDEX: 24.84 KG/M2 | HEIGHT: 62 IN | SYSTOLIC BLOOD PRESSURE: 147 MMHG | TEMPERATURE: 97.9 F | RESPIRATION RATE: 15 BRPM | OXYGEN SATURATION: 96 % | HEART RATE: 83 BPM | DIASTOLIC BLOOD PRESSURE: 51 MMHG

## 2017-12-13 DIAGNOSIS — L03.312 CELLULITIS OF BACK EXCEPT BUTTOCK: Primary | ICD-10-CM

## 2017-12-13 LAB — GLUCOSE BLDC GLUCOMTR-MCNC: 191 MG/DL (ref 70–130)

## 2017-12-13 PROCEDURE — 99283 EMERGENCY DEPT VISIT LOW MDM: CPT

## 2017-12-13 PROCEDURE — 96365 THER/PROPH/DIAG IV INF INIT: CPT

## 2017-12-13 PROCEDURE — 82962 GLUCOSE BLOOD TEST: CPT

## 2017-12-13 PROCEDURE — 25010000002 CEFTRIAXONE PER 250 MG: Performed by: EMERGENCY MEDICINE

## 2017-12-13 PROCEDURE — 96367 TX/PROPH/DG ADDL SEQ IV INF: CPT

## 2017-12-13 RX ORDER — CLINDAMYCIN PHOSPHATE 600 MG/50ML
600 INJECTION INTRAVENOUS ONCE
Status: COMPLETED | OUTPATIENT
Start: 2017-12-13 | End: 2017-12-13

## 2017-12-13 RX ORDER — SODIUM CHLORIDE 0.9 % (FLUSH) 0.9 %
10 SYRINGE (ML) INJECTION AS NEEDED
Status: DISCONTINUED | OUTPATIENT
Start: 2017-12-13 | End: 2017-12-13 | Stop reason: HOSPADM

## 2017-12-13 RX ORDER — CEPHALEXIN 500 MG/1
500 CAPSULE ORAL 4 TIMES DAILY
Qty: 28 CAPSULE | Refills: 0 | Status: SHIPPED | OUTPATIENT
Start: 2017-12-13 | End: 2018-02-23

## 2017-12-13 RX ORDER — CLINDAMYCIN HYDROCHLORIDE 300 MG/1
300 CAPSULE ORAL 4 TIMES DAILY
Qty: 28 CAPSULE | Refills: 0 | Status: SHIPPED | OUTPATIENT
Start: 2017-12-13 | End: 2018-02-23

## 2017-12-13 RX ORDER — CEFTRIAXONE SODIUM 1 G/50ML
1 INJECTION, SOLUTION INTRAVENOUS ONCE
Status: COMPLETED | OUTPATIENT
Start: 2017-12-13 | End: 2017-12-13

## 2017-12-13 RX ADMIN — CEFTRIAXONE SODIUM 1 G: 1 INJECTION, SOLUTION INTRAVENOUS at 11:41

## 2017-12-13 RX ADMIN — CLINDAMYCIN PHOSPHATE 600 MG: 12 INJECTION, SOLUTION INTRAVENOUS at 12:07

## 2017-12-13 NOTE — ED PROVIDER NOTES
Subjective   HPI Comments: Faustino Lopez is a 77 y.o.female who presents to the ED with c/o red streak on her right shoulder. Per her daughter, she noticed a large red streak on her right shoulder yesterday night when she went to visit her at her nursing home. She states the nursing home facility nurse did not notice the streak 2 nights ago. Today, the nursing home facility nurse notice the streak had spread so she referred her here to the ED for evaluation. Her daughter states she has a history of ulcers on her shoulders, forehead, and legs. She reports she also frequently picks at the ulcers and she has a history of similar symptoms with her most recent episode on her lower extremities. She has a history of dementia, diabetes mellitis, and Alzheimer's disease. Her daughter reports she irresponsible in controlling her diabetes mellitis.       Patient is a 77 y.o. female presenting with general illness.   History provided by:  Patient and relative  Illness   Location:  Right shoulder  Quality:  Ovoid ulcer  Severity:  Moderate  Onset quality:  Gradual  Duration:  1 day  Timing:  Constant  Progression:  Worsening  Chronicity:  Recurrent  Context:  Erythemas induration  Relieved by:  Nothing  Worsened by:  Nothing  Ineffective treatments:  None tried  Associated symptoms: rash    Risk factors:  Hx of similar symptoms      Review of Systems   Skin: Positive for rash.   All other systems reviewed and are negative.      Past Medical History:   Diagnosis Date   • Alzheimer disease    • Benign colonic polyp    • Dementia     MEDICATION IN CHART FOR BUT PT STATES SHE DOESN'T KNOW OF BEING DXN   • Diabetes mellitus    • Diarrhea      03 Jul 2014  refer GIuse immodiumsome incontinence, nocturnal diarrhea   • History of migraine    • Hypertension    • Leg cramps    • Macular degeneration    • Vitamin D deficiency     13 Apr 2015  update vitamin D levels       Allergies   Allergen Reactions   • Erythromycin Hives     All  Mycins.  Erythromycin Derivatives.       Past Surgical History:   Procedure Laterality Date   • NO PAST SURGERIES      PT DENIES PAST SURGERIES   • OTHER SURGICAL HISTORY      Dental surgery       Family History   Problem Relation Age of Onset   • Diabetes Father    • Migraines Father      Migraine headaches   • Diabetes Daughter    • Hyperlipidemia Daughter        Social History     Social History   • Marital status:      Spouse name: N/A   • Number of children: N/A   • Years of education: N/A     Social History Main Topics   • Smoking status: Never Smoker   • Smokeless tobacco: Never Used   • Alcohol use No   • Drug use: No   • Sexual activity: Defer     Other Topics Concern   • None     Social History Narrative   • None         Objective   Physical Exam   Constitutional: She is oriented to person, place, and time. She appears well-developed and well-nourished. No distress.   HENT:   Head: Normocephalic and atraumatic.   Right Ear: External ear normal.   Left Ear: External ear normal.   Nose: Nose normal.   Eyes: Conjunctivae are normal. No scleral icterus.   Neck: Normal range of motion. Neck supple.   Cardiovascular: Normal rate, regular rhythm and normal heart sounds.    No murmur heard.  Pulmonary/Chest: Effort normal and breath sounds normal. No respiratory distress. She has no wheezes. She has no rales.   Abdominal: Soft. Bowel sounds are normal. She exhibits no distension. There is no tenderness.   Musculoskeletal: Normal range of motion. She exhibits no edema or tenderness.   Neurological: She is alert and oriented to person, place, and time.   Skin: Skin is warm and dry. There is erythema.   On back right suprascapular 2 centimeters by 1 centimeters roughly ovoid ulcer.   Just below that, there is 1 centimeter deep abrasion.  Erythemas induration surrounding lesion and extending 5 centimeters out from the lesion.   Erythemas streak on right shoulder extending out to right clavicle area.   Area is  tender and warm to touch.   Psychiatric: She has a normal mood and affect. Her behavior is normal.   Nursing note and vitals reviewed.      Procedures         ED Course  ED Course     Cellulitis in a diabetic.  No abscess, no toxic systemic symptoms.  IV abx given.  Pt and family counseled.                MDM    Final diagnoses:   Cellulitis of back except buttock       Documentation assistance provided by yamila Rodriguez.  Information recorded by the scribe was done at my direction and has been verified and validated by me.     Jasper Rodriguez  12/13/17 1143       Rachid Recio MD  12/13/17 [id number]

## 2018-01-05 ENCOUNTER — APPOINTMENT (OUTPATIENT)
Dept: GENERAL RADIOLOGY | Facility: HOSPITAL | Age: 78
End: 2018-01-05

## 2018-01-05 ENCOUNTER — HOSPITAL ENCOUNTER (EMERGENCY)
Facility: HOSPITAL | Age: 78
Discharge: NURSING, REHAB OR LONG-TERM CARE | End: 2018-01-06
Attending: EMERGENCY MEDICINE | Admitting: EMERGENCY MEDICINE

## 2018-01-05 ENCOUNTER — TELEPHONE (OUTPATIENT)
Dept: INTERNAL MEDICINE | Facility: CLINIC | Age: 78
End: 2018-01-05

## 2018-01-05 DIAGNOSIS — R73.9 HYPERGLYCEMIA: Primary | ICD-10-CM

## 2018-01-05 LAB
ALBUMIN SERPL-MCNC: 4.1 G/DL (ref 3.2–4.8)
ALBUMIN/GLOB SERPL: 2 G/DL (ref 1.5–2.5)
ALP SERPL-CCNC: 224 U/L (ref 25–100)
ALT SERPL W P-5'-P-CCNC: 89 U/L (ref 7–40)
ANION GAP SERPL CALCULATED.3IONS-SCNC: 6 MMOL/L (ref 3–11)
AST SERPL-CCNC: 40 U/L (ref 0–33)
BACTERIA UR QL AUTO: ABNORMAL /HPF
BASOPHILS # BLD AUTO: 0.05 10*3/MM3 (ref 0–0.2)
BASOPHILS NFR BLD AUTO: 0.5 % (ref 0–1)
BILIRUB SERPL-MCNC: 0.3 MG/DL (ref 0.3–1.2)
BILIRUB UR QL STRIP: NEGATIVE
BUN BLD-MCNC: 15 MG/DL (ref 9–23)
BUN/CREAT SERPL: 16.7 (ref 7–25)
CALCIUM SPEC-SCNC: 9 MG/DL (ref 8.7–10.4)
CHLORIDE SERPL-SCNC: 105 MMOL/L (ref 99–109)
CLARITY UR: CLEAR
CO2 SERPL-SCNC: 24 MMOL/L (ref 20–31)
COLOR UR: YELLOW
CREAT BLD-MCNC: 0.9 MG/DL (ref 0.6–1.3)
DEPRECATED RDW RBC AUTO: 45.6 FL (ref 37–54)
EOSINOPHIL # BLD AUTO: 0.2 10*3/MM3 (ref 0–0.3)
EOSINOPHIL NFR BLD AUTO: 1.9 % (ref 0–3)
ERYTHROCYTE [DISTWIDTH] IN BLOOD BY AUTOMATED COUNT: 14 % (ref 11.3–14.5)
GFR SERPL CREATININE-BSD FRML MDRD: 61 ML/MIN/1.73
GLOBULIN UR ELPH-MCNC: 2.1 GM/DL
GLUCOSE BLD-MCNC: 341 MG/DL (ref 70–100)
GLUCOSE BLDC GLUCOMTR-MCNC: 322 MG/DL (ref 70–130)
GLUCOSE BLDC GLUCOMTR-MCNC: 349 MG/DL (ref 70–130)
GLUCOSE UR STRIP-MCNC: ABNORMAL MG/DL
HCT VFR BLD AUTO: 38.5 % (ref 34.5–44)
HGB BLD-MCNC: 11.9 G/DL (ref 11.5–15.5)
HGB UR QL STRIP.AUTO: ABNORMAL
HOLD SPECIMEN: NORMAL
HOLD SPECIMEN: NORMAL
HYALINE CASTS UR QL AUTO: ABNORMAL /LPF
IMM GRANULOCYTES # BLD: 0.03 10*3/MM3 (ref 0–0.03)
IMM GRANULOCYTES NFR BLD: 0.3 % (ref 0–0.6)
KETONES UR QL STRIP: NEGATIVE
LEUKOCYTE ESTERASE UR QL STRIP.AUTO: NEGATIVE
LYMPHOCYTES # BLD AUTO: 5.54 10*3/MM3 (ref 0.6–4.8)
LYMPHOCYTES NFR BLD AUTO: 53.4 % (ref 24–44)
MCH RBC QN AUTO: 27.5 PG (ref 27–31)
MCHC RBC AUTO-ENTMCNC: 30.9 G/DL (ref 32–36)
MCV RBC AUTO: 88.9 FL (ref 80–99)
MONOCYTES # BLD AUTO: 0.58 10*3/MM3 (ref 0–1)
MONOCYTES NFR BLD AUTO: 5.6 % (ref 0–12)
NEUTROPHILS # BLD AUTO: 3.98 10*3/MM3 (ref 1.5–8.3)
NEUTROPHILS NFR BLD AUTO: 38.3 % (ref 41–71)
NITRITE UR QL STRIP: NEGATIVE
PH UR STRIP.AUTO: <=5 [PH] (ref 5–8)
PLATELET # BLD AUTO: 168 10*3/MM3 (ref 150–450)
PMV BLD AUTO: 10.4 FL (ref 6–12)
POTASSIUM BLD-SCNC: 4.1 MMOL/L (ref 3.5–5.5)
PROT SERPL-MCNC: 6.2 G/DL (ref 5.7–8.2)
PROT UR QL STRIP: NEGATIVE
RBC # BLD AUTO: 4.33 10*6/MM3 (ref 3.89–5.14)
RBC # UR: ABNORMAL /HPF
REF LAB TEST METHOD: ABNORMAL
SODIUM BLD-SCNC: 135 MMOL/L (ref 132–146)
SP GR UR STRIP: 1.04 (ref 1–1.03)
SQUAMOUS #/AREA URNS HPF: ABNORMAL /HPF
UROBILINOGEN UR QL STRIP: ABNORMAL
WBC NRBC COR # BLD: 10.38 10*3/MM3 (ref 3.5–10.8)
WBC UR QL AUTO: ABNORMAL /HPF
WHOLE BLOOD HOLD SPECIMEN: NORMAL
WHOLE BLOOD HOLD SPECIMEN: NORMAL

## 2018-01-05 PROCEDURE — 81001 URINALYSIS AUTO W/SCOPE: CPT | Performed by: EMERGENCY MEDICINE

## 2018-01-05 PROCEDURE — 85025 COMPLETE CBC W/AUTO DIFF WBC: CPT | Performed by: EMERGENCY MEDICINE

## 2018-01-05 PROCEDURE — 80053 COMPREHEN METABOLIC PANEL: CPT | Performed by: EMERGENCY MEDICINE

## 2018-01-05 PROCEDURE — 82962 GLUCOSE BLOOD TEST: CPT

## 2018-01-05 PROCEDURE — 99285 EMERGENCY DEPT VISIT HI MDM: CPT

## 2018-01-05 PROCEDURE — P9612 CATHETERIZE FOR URINE SPEC: HCPCS

## 2018-01-05 PROCEDURE — 71045 X-RAY EXAM CHEST 1 VIEW: CPT

## 2018-01-05 RX ORDER — ATORVASTATIN CALCIUM 40 MG/1
40 TABLET, FILM COATED ORAL DAILY
COMMUNITY
End: 2018-02-23

## 2018-01-05 RX ORDER — SODIUM CHLORIDE 0.9 % (FLUSH) 0.9 %
10 SYRINGE (ML) INJECTION AS NEEDED
Status: DISCONTINUED | OUTPATIENT
Start: 2018-01-05 | End: 2018-01-06 | Stop reason: HOSPADM

## 2018-01-06 VITALS
WEIGHT: 135 LBS | TEMPERATURE: 97.7 F | DIASTOLIC BLOOD PRESSURE: 59 MMHG | SYSTOLIC BLOOD PRESSURE: 140 MMHG | HEART RATE: 61 BPM | BODY MASS INDEX: 26.5 KG/M2 | RESPIRATION RATE: 16 BRPM | HEIGHT: 60 IN | OXYGEN SATURATION: 99 %

## 2018-01-06 LAB
GLUCOSE BLDC GLUCOMTR-MCNC: 208 MG/DL (ref 70–130)
GLUCOSE BLDC GLUCOMTR-MCNC: 233 MG/DL (ref 70–130)

## 2018-01-06 PROCEDURE — 82962 GLUCOSE BLOOD TEST: CPT

## 2018-01-06 NOTE — ED NOTES
PATIENT WAS TRANSPORTED BY EMS FROM [facility] AFTER SHE WAS FOUND MORE CONFUSED THAN NORMAL. PATIENT HAS BASELINE CONFUSION. PATIENT WITH BLOOD GLUCOSE OF GREATER THAN 600 MG/DL. EMS TRANSPORTED/GIVEN IV AND FLUIDS AND TRANSPORTED.      Mirna Valentin RN  01/05/18 [id number]

## 2018-01-06 NOTE — ED PROVIDER NOTES
Subjective   HPI Comments: Faustino Lopez is a 77 y.o.female with history of dementia who presents to the ED with c/o hyperglycemia. The patient currently resides at [facility]. She is confused at baseline, however, she appeared more confused than usual this evening. Her blood glucose was checked and it measured 600 mg/dL. It was reported to EMS that the patient ate five cookies at dinner. She was given Humalog at the nursing home with minimal relief. She was also given 500 mL of normal saline en route to the ED. She denies vomiting, fevers, or any other complaints at this time.       Patient is a 77 y.o. female presenting with hyperglycemia.   History provided by:  Patient and EMS personnel  History limited by:  Dementia  Hyperglycemia   Blood sugar level PTA:  600  Severity:  Moderate  Onset quality:  Sudden  Timing:  Constant  Progression:  Unchanged  Chronicity:  Chronic  Diabetes status:  Controlled with insulin  Current diabetic therapy:  Humalog   Context: recent change in diet (she ate 5 cookies at dinner )    Relieved by:  Nothing  Ineffective treatments: Humalog and fluids   Associated symptoms: confusion    Associated symptoms: no fever and no vomiting        Review of Systems   Unable to perform ROS: Dementia   Constitutional: Negative for fever.   Gastrointestinal: Negative for vomiting.   Endocrine:        Hyperglycemia   Psychiatric/Behavioral: Positive for confusion.       Past Medical History:   Diagnosis Date   • Alzheimer disease    • Benign colonic polyp    • Dementia     MEDICATION IN CHART FOR BUT PT STATES SHE DOESN'T KNOW OF BEING DXN   • Diabetes mellitus    • Diarrhea      03 Jul 2014  refer GIuse immodiumsome incontinence, nocturnal diarrhea   • History of migraine    • Hypertension    • Leg cramps    • Macular degeneration    • Vitamin D deficiency     13 Apr 2015  update vitamin D levels       Allergies   Allergen Reactions   • Erythromycin Hives     All Mycins.  Erythromycin  Derivatives.       Past Surgical History:   Procedure Laterality Date   • NO PAST SURGERIES      PT DENIES PAST SURGERIES   • OTHER SURGICAL HISTORY      Dental surgery       Family History   Problem Relation Age of Onset   • Diabetes Father    • Migraines Father      Migraine headaches   • Diabetes Daughter    • Hyperlipidemia Daughter        Social History     Social History   • Marital status:      Spouse name: N/A   • Number of children: N/A   • Years of education: N/A     Social History Main Topics   • Smoking status: Never Smoker   • Smokeless tobacco: Never Used   • Alcohol use No   • Drug use: No   • Sexual activity: Defer     Other Topics Concern   • Not on file     Social History Narrative   • No narrative on file         Objective   Physical Exam   Constitutional: She appears well-developed and well-nourished. No distress.   Poor historian as she is unsure of why she was brought to the ED. She is conversant and smiling.    HENT:   Head: Normocephalic and atraumatic.   Nose: Nose normal.   Eyes: Conjunctivae are normal. No scleral icterus.   Neck: Normal range of motion. Neck supple.   Cardiovascular: Normal rate, regular rhythm and normal heart sounds.    No murmur heard.  Pulmonary/Chest: Effort normal and breath sounds normal. No respiratory distress.   Abdominal: Soft. Bowel sounds are normal. There is no tenderness.   Musculoskeletal: Normal range of motion. She exhibits no edema.   Neurological: She is alert.   Oriented to person and place but not time.    Skin: Skin is warm and dry.   Nursing note and vitals reviewed.      Procedures         ED Course  ED Course     Recent Results (from the past 24 hour(s))   POC Glucose Once    Collection Time: 01/05/18  9:38 PM   Result Value Ref Range    Glucose 349 (H) 70 - 130 mg/dL   POC Glucose Once    Collection Time: 01/05/18  9:59 PM   Result Value Ref Range    Glucose 322 (H) 70 - 130 mg/dL   Comprehensive Metabolic Panel    Collection Time:  01/05/18 10:20 PM   Result Value Ref Range    Glucose 341 (H) 70 - 100 mg/dL    BUN 15 9 - 23 mg/dL    Creatinine 0.90 0.60 - 1.30 mg/dL    Sodium 135 132 - 146 mmol/L    Potassium 4.1 3.5 - 5.5 mmol/L    Chloride 105 99 - 109 mmol/L    CO2 24.0 20.0 - 31.0 mmol/L    Calcium 9.0 8.7 - 10.4 mg/dL    Total Protein 6.2 5.7 - 8.2 g/dL    Albumin 4.10 3.20 - 4.80 g/dL    ALT (SGPT) 89 (H) 7 - 40 U/L    AST (SGOT) 40 (H) 0 - 33 U/L    Alkaline Phosphatase 224 (H) 25 - 100 U/L    Total Bilirubin 0.3 0.3 - 1.2 mg/dL    eGFR Non African Amer 61 >60 mL/min/1.73    Globulin 2.1 gm/dL    A/G Ratio 2.0 1.5 - 2.5 g/dL    BUN/Creatinine Ratio 16.7 7.0 - 25.0    Anion Gap 6.0 3.0 - 11.0 mmol/L   Light Blue Top    Collection Time: 01/05/18 10:20 PM   Result Value Ref Range    Extra Tube hold for add-on    Green Top (Gel)    Collection Time: 01/05/18 10:20 PM   Result Value Ref Range    Extra Tube Hold for add-ons.    Lavender Top    Collection Time: 01/05/18 10:20 PM   Result Value Ref Range    Extra Tube hold for add-on    Gold Top - SST    Collection Time: 01/05/18 10:20 PM   Result Value Ref Range    Extra Tube Hold for add-ons.    CBC Auto Differential    Collection Time: 01/05/18 10:20 PM   Result Value Ref Range    WBC 10.38 3.50 - 10.80 10*3/mm3    RBC 4.33 3.89 - 5.14 10*6/mm3    Hemoglobin 11.9 11.5 - 15.5 g/dL    Hematocrit 38.5 34.5 - 44.0 %    MCV 88.9 80.0 - 99.0 fL    MCH 27.5 27.0 - 31.0 pg    MCHC 30.9 (L) 32.0 - 36.0 g/dL    RDW 14.0 11.3 - 14.5 %    RDW-SD 45.6 37.0 - 54.0 fl    MPV 10.4 6.0 - 12.0 fL    Platelets 168 150 - 450 10*3/mm3    Neutrophil % 38.3 (L) 41.0 - 71.0 %    Lymphocyte % 53.4 (H) 24.0 - 44.0 %    Monocyte % 5.6 0.0 - 12.0 %    Eosinophil % 1.9 0.0 - 3.0 %    Basophil % 0.5 0.0 - 1.0 %    Immature Grans % 0.3 0.0 - 0.6 %    Neutrophils, Absolute 3.98 1.50 - 8.30 10*3/mm3    Lymphocytes, Absolute 5.54 (H) 0.60 - 4.80 10*3/mm3    Monocytes, Absolute 0.58 0.00 - 1.00 10*3/mm3    Eosinophils,  "Absolute 0.20 0.00 - 0.30 10*3/mm3    Basophils, Absolute 0.05 0.00 - 0.20 10*3/mm3    Immature Grans, Absolute 0.03 0.00 - 0.03 10*3/mm3   Urinalysis With / Culture If Indicated - Urine, Catheter    Collection Time: 01/05/18 10:39 PM   Result Value Ref Range    Color, UA Yellow Yellow, Straw    Appearance, UA Clear Clear    pH, UA <=5.0 5.0 - 8.0    Specific Gravity, UA 1.036 (H) 1.001 - 1.030    Glucose, UA >=1000 mg/dL (3+) (A) Negative    Ketones, UA Negative Negative    Bilirubin, UA Negative Negative    Blood, UA Trace (A) Negative    Protein, UA Negative Negative    Leuk Esterase, UA Negative Negative    Nitrite, UA Negative Negative    Urobilinogen, UA 0.2 E.U./dL 0.2 - 1.0 E.U./dL   Urinalysis, Microscopic Only - Urine, Clean Catch    Collection Time: 01/05/18 10:39 PM   Result Value Ref Range    RBC, UA None Seen None Seen, 0-2 /HPF    WBC, UA 0-2 (A) None Seen /HPF    Bacteria, UA None Seen None Seen, Trace /HPF    Squamous Epithelial Cells, UA 0-2 None Seen, 0-2 /HPF    Hyaline Casts, UA 0-6 0 - 6 /LPF    Methodology Automated Microscopy      Note: In addition to lab results from this visit, the labs listed above may include labs taken at another facility or during a different encounter within the last 24 hours. Please correlate lab times with ED admission and discharge times for further clarification of the services performed during this visit.    XR Chest 1 View    (Results Pending)     Vitals:    01/05/18 2141 01/05/18 2257   BP: 140/52    BP Location: Right arm    Patient Position: Lying    Pulse: 68 73   Resp: 18    Temp: 98.2 °F (36.8 °C)    TempSrc: Oral    SpO2: 98% 93%   Weight: 61.2 kg (135 lb)    Height: 152.4 cm (60\")      Medications   sodium chloride 0.9 % flush 10 mL (not administered)     ECG/EMG Results (last 24 hours)     ** No results found for the last 24 hours. **        Pt remained asymptomatic throughout ED stay.  Hyperglycemia is consistent with potential increased glucose " ingestion as has been reported.    She will be transferred back to her care facility with instructions to return should concerns arise.            MDM    Final diagnoses:   Hyperglycemia       Documentation assistance provided by yamila Burger.  Information recorded by the scribe was done at my direction and has been verified and validated by me.     Ivania Burger  01/05/18 2224       Ivania Burger  01/05/18 2337       Abraham Haskins DO  01/16/18 2222

## 2018-01-06 NOTE — TELEPHONE ENCOUNTER
On call note  Daughter vacationing in [place]. Was concerned that Mom being sent to ER for sugar over 600.  Pt has Alzheimers. Explained to daughter that we did not know what her glucose level was, only that is was very high, which can cause electrolyte imbalance and dehydration. Daughter was ok with explanation

## 2018-01-08 ENCOUNTER — TELEPHONE (OUTPATIENT)
Dept: INTERNAL MEDICINE | Facility: CLINIC | Age: 78
End: 2018-01-08

## 2018-01-08 RX ORDER — PSEUDOEPHEDRINE HCL 30 MG
30 TABLET ORAL
COMMUNITY

## 2018-01-08 RX ORDER — GLIMEPIRIDE 4 MG/1
4 TABLET ORAL
COMMUNITY
End: 2018-02-23 | Stop reason: SDUPTHER

## 2018-01-08 NOTE — TELEPHONE ENCOUNTER
Notified Pharmacy Per Dr. Waller OK to continue Metformin    Call to state that r Metform 1000mg BID had been DC and then resubmitted, just wanted to double check that it is ok .  I am to call him back at the number below if OK to fill  It was sent in by the  at the [facility] facility where she is.     [phone]  [phone]  Please advise.

## 2018-01-10 ENCOUNTER — TELEPHONE (OUTPATIENT)
Dept: INTERNAL MEDICINE | Facility: CLINIC | Age: 78
End: 2018-01-10

## 2018-01-10 NOTE — TELEPHONE ENCOUNTER
Pt is a currently at [facility] and she is experiencing vomiting and diarrhea.  Her glucose was 330 this morning (fasting) and they want to know if they should continue with her insulin while she is experiencing this illness.    I explained that dr sharma is out of the office on Wednesdays, but I would have a nurse check with the another endocrinologist    Please contact leslee or the nurse at [facility] @ [phone]

## 2018-01-10 NOTE — TELEPHONE ENCOUNTER
Spoke to kellen at Nursing home. Pt having intermittent diarrhea and vomiting today but still eating meals and drinking cranberry juice. Kellen has been giving patient her insulin as prescribed:    AM  - received 14 units Humalog  Noon  - received 16 units Humalog  5pm  - received 24 units of humalog    Advised kellen to keep lantus at 16 units at night  Advised her to give same humalog doses but add a correction scale based on pre-meal BG  of 1 unit: 50 > 150 (max dose of 5 units)    Stacy Sims MD

## 2018-01-16 RX ORDER — SERTRALINE HYDROCHLORIDE 100 MG/1
TABLET, FILM COATED ORAL
Qty: 45 TABLET | Refills: 6 | Status: SHIPPED | OUTPATIENT
Start: 2018-01-16 | End: 2018-09-25 | Stop reason: SDUPTHER

## 2018-02-23 ENCOUNTER — OFFICE VISIT (OUTPATIENT)
Dept: ENDOCRINOLOGY | Facility: CLINIC | Age: 78
End: 2018-02-23

## 2018-02-23 VITALS
WEIGHT: 124 LBS | SYSTOLIC BLOOD PRESSURE: 100 MMHG | DIASTOLIC BLOOD PRESSURE: 60 MMHG | HEIGHT: 62 IN | BODY MASS INDEX: 22.82 KG/M2 | OXYGEN SATURATION: 98 % | HEART RATE: 83 BPM

## 2018-02-23 DIAGNOSIS — I10 BENIGN ESSENTIAL HYPERTENSION: Chronic | ICD-10-CM

## 2018-02-23 DIAGNOSIS — IMO0001 UNCONTROLLED DIABETES MELLITUS TYPE 2 WITHOUT COMPLICATIONS, UNSPECIFIED LONG TERM INSULIN USE STATUS: Primary | Chronic | ICD-10-CM

## 2018-02-23 DIAGNOSIS — R41.3 MEMORY LOSS: Chronic | ICD-10-CM

## 2018-02-23 LAB
GLUCOSE BLDC GLUCOMTR-MCNC: 430 MG/DL (ref 70–130)
HBA1C MFR BLD: 8.8 %

## 2018-02-23 PROCEDURE — 99214 OFFICE O/P EST MOD 30 MIN: CPT | Performed by: INTERNAL MEDICINE

## 2018-02-23 PROCEDURE — 82947 ASSAY GLUCOSE BLOOD QUANT: CPT | Performed by: INTERNAL MEDICINE

## 2018-02-23 PROCEDURE — 83036 HEMOGLOBIN GLYCOSYLATED A1C: CPT | Performed by: INTERNAL MEDICINE

## 2018-02-23 RX ORDER — ATORVASTATIN CALCIUM 40 MG/1
TABLET, FILM COATED ORAL
COMMUNITY
Start: 2017-11-28

## 2018-02-23 RX ORDER — IBUPROFEN 200 MG
CAPSULE ORAL
Refills: 0 | COMMUNITY
Start: 2018-02-19

## 2018-02-23 RX ORDER — ONDANSETRON 4 MG/1
TABLET, FILM COATED ORAL
Refills: 0 | COMMUNITY
Start: 2018-01-10

## 2018-02-23 RX ORDER — LORAZEPAM 1 MG/1
TABLET ORAL
Refills: 0 | COMMUNITY
Start: 2018-02-19

## 2018-02-23 NOTE — PROGRESS NOTES
"Faustino Lopez 77 y.o.  [facility]: Follow-up; Diabetes (Type II); Hypertension; Hypothyroidism; Memory Loss; and Diarrhea    [place]: Follow-up; Diabetes (Type II); Hypertension; Hypothyroidism; Memory Loss; and Diarrhea    They are considering stopping aricept  Also having a lot of diarrhea due to metformin   Sugars are higher assoc with sweet tea and liquid sugars  Just moved to [facility] side for memory care  Has been to ER for high sugar - had had sugared drinks and cookies   Blood sugar and 90 day average sugar reviewed  Results for orders placed or performed in visit on 02/23/18   POC Glycosylated Hemoglobin (Hb A1C)   Result Value Ref Range    Hemoglobin A1C 8.8 %   POC Glucose Fingerstick   Result Value Ref Range    Glucose 430 (A) 70 - 130 mg/dL     Average sugar is 200   Current sugar is 430   Discussed treatment of higher sugars  Sugar 101-250 fasting   Pp breakfasst 104 - 322   Pp lunch 228- 460  Pp dinner 177-585  Has lost about 11 lbs   bp is good    Allergies   Allergen Reactions   • Erythromycin Hives     All Mycins.  Erythromycin Derivatives.       Current Outpatient Prescriptions:   •  ANTI-DIARRHEAL 2 MG tablet, Take 1 tablet by mouth 3 (Three) Times a Day As Needed., Disp: , Rfl: 5  •  atorvastatin (LIPITOR) 40 MG tablet, Take 40 mg by mouth Daily., Disp: , Rfl:   •  BD AUTOSHIELD DUO 30G X 5 MM misc, , Disp: , Rfl: 0  •  Cholecalciferol (VITAMIN D3) 2000 UNITS tablet, Take 1 tablet by mouth., Disp: , Rfl:   •  Cinnamon 500 MG capsule, Take 2 capsules by mouth 2 (Two) Times a Day., Disp: , Rfl: 6  •  clotrimazole-betamethasone (LOTRISONE) 1-0.05 % cream, , Disp: , Rfl:   •  donepezil (ARICEPT) 10 MG tablet, Take  by mouth., Disp: , Rfl:   •  EASY TOUCH INSULIN SAFETY SYR 29G X 1/2\" 1 ML misc, INJECT WITH INSULIN FOUR TIMES A DAY OR AS DIRECTED, Disp: 100 each, Rfl: 5  •  EASY TOUCH LANCETS 30G misc, USE TO TEST BLOOD SUGAR FOUR TIMES A DAY *PHYSICIAN ORDER EXPIRES 2/24/16*, Disp: 100 each, Rfl: 5  •  " EASYMAX TEST test strip, USE TO TEST BLOOD SUGAR FOUR TIMES A DAY *PHYSICIAN ORDER EXPIRES 2/26/15, Disp: 150 each, Rfl: 5  •  EASYTEST LANCETS misc, Test blood sugar QID or as directed, Disp: 150 each, Rfl: 5  •  glimepiride (AMARYL) 4 MG tablet, Take  by mouth 2 (two) times a day., Disp: , Rfl:   •  HUMALOG 100 UNIT/ML injection, INJECT 12U SUBCUTANEOUSLY BEFORE BREAKFAST, 14U BEFORE LUNCH AND 22U BEFORE DINNER *DISCARD 28 DAYS AFTER OPENING* (Patient taking differently: INJECT 14U SUBCUTANEOUSLY BEFORE BREAKFAST, 16U BEFORE LUNCH AND 24U BEFORE DINNER *DISCARD 28 DAYS AFTER OPENING*), Disp: 10 mL, Rfl: 6  •  HYDROcodone-acetaminophen (NORCO) 5-325 MG per tablet, Take  by mouth., Disp: , Rfl:   •  Insulin Glargine (LANTUS SOLOSTAR) 100 UNIT/ML injection pen, Inject 16 Units under the skin Every Night., Disp: , Rfl:   •  Insulin Pen Needle (B-D UF III MINI PEN NEEDLES) 31G X 5 MM misc, , Disp: , Rfl:   •  Insulin Pen Needle (EASY TOUCH PEN NEEDLES) 31G X 5 MM misc, , Disp: , Rfl:   •  levothyroxine (SYNTHROID, LEVOTHROID) 25 MCG tablet, , Disp: , Rfl:   •  lisinopril (PRINIVIL,ZESTRIL) 5 MG tablet, Take  by mouth daily., Disp: , Rfl:   •  loratadine (CLARITIN) 10 MG tablet, Take  by mouth., Disp: , Rfl:   •  memantine (NAMENDA) 10 MG tablet, Take 1 tablet by mouth 2 (Two) Times a Day., Disp: 60 tablet, Rfl: 11  •  Menthol-Zinc Oxide (CALMOSEPTINE) 0.44-20.6 % ointment, Apply  topically 2 (Two) Times a Day., Disp: , Rfl:   •  metFORMIN (GLUCOPHAGE) 1000 MG tablet, Take 1 tablet by mouth 2 (Two) Times a Day., Disp: 60 tablet, Rfl: 11  •  Multiple Vitamin tablet, Take  by mouth., Disp: , Rfl:   •  Multiple Vitamins-Minerals (PRESERVISION AREDS 2) capsule, Take  by mouth., Disp: , Rfl:   •  NAMENDA XR 28 MG capsule sustained-release 24 hr extended release capsule, , Disp: , Rfl: 7  •  NON-ASPIRIN PAIN RELIEF 325 MG tablet, Take 2 tablets by mouth 4 (Four) Times a Day As Needed., Disp: , Rfl: 5  •  Omega-3 Fatty Acids  (FISH OIL) 1000 MG capsule capsule, Take  by mouth., Disp: , Rfl:   •  pseudoephedrine (SUDAFED) 30 MG tablet, Take 30 mg by mouth as needed.  , Disp: , Rfl:   •  pseudoephedrine (SUDAFED) 30 MG tablet, Take 30 mg by mouth., Disp: , Rfl:   •  saccharomyces boulardii (FLORASTOR) 250 MG capsule, Take 1 capsule by mouth daily., Disp: , Rfl:   •  sertraline (ZOLOFT) 100 MG tablet, TAKE 1&1/2 TABLETS BY MOUTH AT BEDTIME, Disp: 45 tablet, Rfl: 6  Patient Active Problem List    Diagnosis   • Acquired hypothyroidism [E03.9]   • History of macular degeneration [Z86.69]   • History of migraine [Z86.69]   • Seasonal allergies [J30.2]     Overview Note:     13 Apr 2015  on claritin     • Late onset Alzheimer's disease without behavioral disturbance [G30.1, F02.80]     Overview Note:     Impression: 04/25/2016 - stable  Impression: 04/13/2015 - at [facility]  seeing Dr Evans  Impression: 06/04/2014 - jose rafael 5/14;      • Benign colonic polyp [K63.5]   • Benign essential hypertension [I10]     Overview Note:     Impression: 04/25/2016 - bp is good  Impression: 11/19/2015 - bp is good  Impression: 01/07/2015 - bp is good  Impression: 07/03/2014 - bp is high- add amlodipine 5 mg daily;      • Chronic lymphocytic leukemia [C91.10]     Overview Note:     Impression: 04/13/2015 - check cbc;      • Depression [F32.9]   • Uncontrolled type 2 diabetes mellitus [E11.65]     Overview Note:     Impression: 04/25/2016 - check cmp on metformin   hgn a1c 8.4%   no change recommended   occ low- is holding am insulin    ok but will update cmp  Impression: 11/19/2015 - blood sugar is 260 now pp   hgn a1c 7.6%  is up to date with foot care   we are reviewing sugars every 2-3 weeks   and adjusting her insulin  Impression: 04/13/2015 - blood sugar is 118.72, hgn a1c is 9.2% (average 210)  is getting blood sugar testing 4 times daily  we just raised humalog   no neuropathy  checking 4 times a day at NH  change order to be before meals and  at bedtime  ok podiatry to see  Impression: 01/07/2015 - blood sugar and 90 day average sugar are higher.  check blood sugars ac and hs and ssi written   better blood sugars overall with titration of long acting insulin   added back up ssi prn ac only- no hs coverage  discussed blood sugar 330 and hgn a1c 9.5%  f/u 3-4 months  Impression: 07/03/2014 - blood sugar 279 after cereal, discussed adding protein to each meal, hgn a1c 8.2% (improved) - average 180  is up to date with eye exam, no neuropathy and neg ur alb.  update cmp  Impression: 04/28/2014 - blood sugar is 196, hgn a1c 9.1% (average 210)  eye exam shows macular degeneration  no neuropathy  ur alb neg - reviewed outside lab work with normal creatinine.  discussed options for treatment and would like for her to d/c actos, take metformin 1000 mg +amaryl 4 mg twice daily and add lantus 10 u daily to regimen    4/25/2016:  blood sugar 220 and hgn a1c 8.4%   bp is good   appetite good but not getting am insulin - is sleeping til noon and was having low sugars with am insulin   has several skin lesions right shoulder and wrist- neurodermatitis   memory is stable   discussed blood sugars and average sugar, goals based on cognitive dysfunction and currently I feel like blood sugar is in acceptable range in light of this   rare low sugar    she will continue to follow diet and work on weight loss. check blood sugars fasting and before dinner, email these weekly.  f/u 8 weeks.;      • Diarrhea [R19.7]     Overview Note:     Impression: 07/03/2014 - refer GI  use immodium  some incontinence, nocturnal diarrhea;      • Hyperlipidemia [E78.5]     Overview Note:     Impression: 11/19/2015 - update 4/16 - annually  has had flu shot   daughter will check on pneumonia shot - recc discussed  Impression: 04/13/2015 - check flp  Impression: 07/03/2014 - check flp  Impression: 04/28/2014 - check next ov.;      • Irritable bowel syndrome [K58.9]     Overview Note:      Impression: 07/03/2014 - refer to GI, some cramping and incontinence - not sure when last colonoscopy was, h/o food allergies  check celiac titers, refer to GI;    03 Jul 2014  refer to GI, some cramping and incontinence - not sure      when last colonoscopy was, h/o food allergiescheck celiac titers, refer to GI  Kesha Waller (Endocrinology)     • Cramps of lower extremity [R25.2]   • Leukocytosis [D72.829]     Overview Note:     Impression: 04/13/2015 - check cbc;      • Memory loss [R41.3]     Overview Note:     Loss of memory     • Neurodermatitis [L28.0]     Overview Note:     Impression: 04/25/2016 - NH is watching- may be due to CLL   keep covered, neosporin prn;      • Seasonal allergic rhinitis [J30.2]     Overview Note:     Impression: 04/13/2015 - on claritin;      • Vitamin D deficiency [E55.9]     Overview Note:     Impression: 04/13/2015 - update vitamin D levels  Impression: 07/03/2014 - update level;      • Fibrocystic breast changes [N60.19]   • Encounter for screening mammogram for malignant neoplasm of breast [Z12.31]   • Mammographic microcalcification [R92.0]   • Other specified postprocedural states [Z98.890]     Review of Systems   Constitutional: Positive for unexpected weight change. Negative for activity change, appetite change, chills, diaphoresis, fatigue and fever.   HENT: Negative for congestion, dental problem, drooling, ear discharge, ear pain, facial swelling, hearing loss, mouth sores, nosebleeds, postnasal drip, rhinorrhea, sinus pressure, sneezing, sore throat, tinnitus, trouble swallowing and voice change.    Eyes: Negative for photophobia, pain, discharge, redness, itching and visual disturbance.   Respiratory: Negative for apnea, cough, choking, chest tightness, shortness of breath, wheezing and stridor.    Cardiovascular: Negative for chest pain, palpitations and leg swelling.   Gastrointestinal: Positive for diarrhea. Negative for abdominal distention, abdominal  "pain, anal bleeding, blood in stool, constipation, nausea, rectal pain and vomiting.   Endocrine: Negative for cold intolerance, heat intolerance, polydipsia, polyphagia and polyuria.   Genitourinary: Negative for decreased urine volume, difficulty urinating, dysuria, enuresis, flank pain, frequency, genital sores, hematuria and urgency.   Musculoskeletal: Negative for arthralgias, back pain, gait problem, joint swelling, myalgias, neck pain and neck stiffness.   Skin: Negative for color change, pallor, rash and wound.   Allergic/Immunologic: Negative for environmental allergies, food allergies and immunocompromised state.   Neurological: Negative for dizziness, tremors, seizures, syncope, facial asymmetry, speech difficulty, weakness, light-headedness, numbness and headaches.   Hematological: Negative for adenopathy. Does not bruise/bleed easily.   Psychiatric/Behavioral: Negative for agitation, behavioral problems, confusion, decreased concentration, dysphoric mood, hallucinations, self-injury, sleep disturbance and suicidal ideas. The patient is not nervous/anxious and is not hyperactive.      Social History     Social History   • Marital status:      Spouse name: N/A   • Number of children: N/A   • Years of education: N/A     Occupational History   • Not on file.     Social History Main Topics   • Smoking status: Never Smoker   • Smokeless tobacco: Never Used   • Alcohol use No   • Drug use: No   • Sexual activity: Defer     Other Topics Concern   • Not on file     Social History Narrative     Family History   Problem Relation Age of Onset   • Diabetes Father    • Migraines Father      Migraine headaches   • Diabetes Daughter    • Hyperlipidemia Daughter      /60  Pulse 83  Ht 157.5 cm (62\")  Wt 56.2 kg (124 lb)  SpO2 98%  BMI 22.68 kg/m2  Physical Exam   Constitutional: She is oriented to person, place, and time. She appears well-developed and well-nourished.   HENT:   Head: Normocephalic and " atraumatic.   Nose: Nose normal.   Mouth/Throat: Oropharynx is clear and moist.   Eyes: Conjunctivae, EOM and lids are normal. Pupils are equal, round, and reactive to light.   Neck: Trachea normal and normal range of motion. Neck supple. Carotid bruit is not present. No tracheal deviation present. No thyroid mass and no thyromegaly present.   Cardiovascular: Normal rate, regular rhythm, normal heart sounds and intact distal pulses.  Exam reveals no gallop and no friction rub.    No murmur heard.  Pulmonary/Chest: Effort normal and breath sounds normal. No respiratory distress. She has no wheezes.   Musculoskeletal: Normal range of motion. She exhibits no edema or deformity.    Faustino had a diabetic foot exam performed today.   During the foot exam she had a monofilament test performed.    Neurological Sensory Findings - Unaltered hot/cold right ankle/foot discrimination and unaltered hot/cold left ankle/foot discrimination. Unaltered sharp/dull right ankle/foot discrimination and unaltered sharp/dull left ankle/foot discrimination. No right ankle/foot altered proprioception and no left ankle/foot altered proprioception    Vascular Status -  Her exam exhibits right foot vasculature normal. Her exam exhibits no right foot edema. Her exam exhibits left foot vasculature normal. Her exam exhibits no left foot edema.   Skin Integrity  -  Her right foot skin is intact.     Faustino 's left foot skin is intact. .     Lymphadenopathy:     She has no cervical adenopathy.   Neurological: She is alert and oriented to person, place, and time. She has normal reflexes. She displays normal reflexes. No cranial nerve deficit.   Skin: Skin is warm and dry. No rash noted. No cyanosis or erythema. Nails show no clubbing.   Psychiatric: She has a normal mood and affect. Her speech is normal and behavior is normal. Judgment and thought content normal. Cognition and memory are normal.   Nursing note and vitals reviewed.    Results for  orders placed or performed during the hospital encounter of 01/05/18   Comprehensive Metabolic Panel   Result Value Ref Range    Glucose 341 (H) 70 - 100 mg/dL    BUN 15 9 - 23 mg/dL    Creatinine 0.90 0.60 - 1.30 mg/dL    Sodium 135 132 - 146 mmol/L    Potassium 4.1 3.5 - 5.5 mmol/L    Chloride 105 99 - 109 mmol/L    CO2 24.0 20.0 - 31.0 mmol/L    Calcium 9.0 8.7 - 10.4 mg/dL    Total Protein 6.2 5.7 - 8.2 g/dL    Albumin 4.10 3.20 - 4.80 g/dL    ALT (SGPT) 89 (H) 7 - 40 U/L    AST (SGOT) 40 (H) 0 - 33 U/L    Alkaline Phosphatase 224 (H) 25 - 100 U/L    Total Bilirubin 0.3 0.3 - 1.2 mg/dL    eGFR Non African Amer 61 >60 mL/min/1.73    Globulin 2.1 gm/dL    A/G Ratio 2.0 1.5 - 2.5 g/dL    BUN/Creatinine Ratio 16.7 7.0 - 25.0    Anion Gap 6.0 3.0 - 11.0 mmol/L   Urinalysis With / Culture If Indicated - Urine, Catheter   Result Value Ref Range    Color, UA Yellow Yellow, Straw    Appearance, UA Clear Clear    pH, UA <=5.0 5.0 - 8.0    Specific Gravity, UA 1.036 (H) 1.001 - 1.030    Glucose, UA >=1000 mg/dL (3+) (A) Negative    Ketones, UA Negative Negative    Bilirubin, UA Negative Negative    Blood, UA Trace (A) Negative    Protein, UA Negative Negative    Leuk Esterase, UA Negative Negative    Nitrite, UA Negative Negative    Urobilinogen, UA 0.2 E.U./dL 0.2 - 1.0 E.U./dL   CBC Auto Differential   Result Value Ref Range    WBC 10.38 3.50 - 10.80 10*3/mm3    RBC 4.33 3.89 - 5.14 10*6/mm3    Hemoglobin 11.9 11.5 - 15.5 g/dL    Hematocrit 38.5 34.5 - 44.0 %    MCV 88.9 80.0 - 99.0 fL    MCH 27.5 27.0 - 31.0 pg    MCHC 30.9 (L) 32.0 - 36.0 g/dL    RDW 14.0 11.3 - 14.5 %    RDW-SD 45.6 37.0 - 54.0 fl    MPV 10.4 6.0 - 12.0 fL    Platelets 168 150 - 450 10*3/mm3    Neutrophil % 38.3 (L) 41.0 - 71.0 %    Lymphocyte % 53.4 (H) 24.0 - 44.0 %    Monocyte % 5.6 0.0 - 12.0 %    Eosinophil % 1.9 0.0 - 3.0 %    Basophil % 0.5 0.0 - 1.0 %    Immature Grans % 0.3 0.0 - 0.6 %    Neutrophils, Absolute 3.98 1.50 - 8.30 10*3/mm3     Lymphocytes, Absolute 5.54 (H) 0.60 - 4.80 10*3/mm3    Monocytes, Absolute 0.58 0.00 - 1.00 10*3/mm3    Eosinophils, Absolute 0.20 0.00 - 0.30 10*3/mm3    Basophils, Absolute 0.05 0.00 - 0.20 10*3/mm3    Immature Grans, Absolute 0.03 0.00 - 0.03 10*3/mm3   Urinalysis, Microscopic Only - Urine, Clean Catch   Result Value Ref Range    RBC, UA None Seen None Seen, 0-2 /HPF    WBC, UA 0-2 (A) None Seen /HPF    Bacteria, UA None Seen None Seen, Trace /HPF    Squamous Epithelial Cells, UA 0-2 None Seen, 0-2 /HPF    Hyaline Casts, UA 0-6 0 - 6 /LPF    Methodology Automated Microscopy    POC Glucose Once   Result Value Ref Range    Glucose 349 (H) 70 - 130 mg/dL   POC Glucose Once   Result Value Ref Range    Glucose 322 (H) 70 - 130 mg/dL   POC Glucose Once   Result Value Ref Range    Glucose 233 (H) 70 - 130 mg/dL   POC Glucose Once   Result Value Ref Range    Glucose 208 (H) 70 - 130 mg/dL   Light Blue Top   Result Value Ref Range    Extra Tube hold for add-on    Green Top (Gel)   Result Value Ref Range    Extra Tube Hold for add-ons.    Lavender Top   Result Value Ref Range    Extra Tube hold for add-on    Gold Top - SST   Result Value Ref Range    Extra Tube Hold for add-ons.      Problem List Items Addressed This Visit        Cardiovascular and Mediastinum    Benign essential hypertension (Chronic)     bp is good/ low - continue to monitor             Endocrine    Uncontrolled type 2 diabetes mellitus - Primary (Chronic)     Blood sugar and 90 day average sugar reviewed  Results for orders placed or performed in visit on 02/23/18   POC Glycosylated Hemoglobin (Hb A1C)   Result Value Ref Range    Hemoglobin A1C 8.8 %   POC Glucose Fingerstick   Result Value Ref Range    Glucose 430 (A) 70 - 130 mg/dL    average sugar around 200   Ok to stop metformin - having a lot of diarrhea  Sliding scale in addition to scheduled doses  If sugar less than 200 no additional units  201-250 2 additional units  251-300 4 additional  units  301-350 6 additional units  351-400 8 additional units  Over 400 10 additional units (and repeat blood sugar in 4 hours, call MD if sugar x 2 over 400)   Ok to d/c metformin   Goal a1c 8 given comorbidities  Watch for am lows with weight loss  Considering d/c metformin          Relevant Orders    POC Glycosylated Hemoglobin (Hb A1C) (Completed)    POC Glucose Fingerstick (Completed)       Other    Memory loss (Chronic)     See above- has upcoming neuro appt              Return in about 6 months (around 8/23/2018) for Recheck 30 min .    Maryam Steele MA  Signed Kesha Waller MD

## 2018-02-23 NOTE — ASSESSMENT & PLAN NOTE
Blood sugar and 90 day average sugar reviewed  Results for orders placed or performed in visit on 02/23/18   POC Glycosylated Hemoglobin (Hb A1C)   Result Value Ref Range    Hemoglobin A1C 8.8 %   POC Glucose Fingerstick   Result Value Ref Range    Glucose 430 (A) 70 - 130 mg/dL    average sugar around 200   Ok to stop metformin - having a lot of diarrhea  Sliding scale in addition to scheduled doses  If sugar less than 200 no additional units  201-250 2 additional units  251-300 4 additional units  301-350 6 additional units  351-400 8 additional units  Over 400 10 additional units (and repeat blood sugar in 4 hours, call MD if sugar x 2 over 400)   Ok to d/c metformin   Goal a1c 8 given comorbidities  Watch for am lows with weight loss  Considering d/c metformin

## 2018-03-13 ENCOUNTER — OFFICE VISIT (OUTPATIENT)
Dept: NEUROLOGY | Facility: CLINIC | Age: 78
End: 2018-03-13

## 2018-03-13 VITALS
HEIGHT: 62 IN | SYSTOLIC BLOOD PRESSURE: 128 MMHG | WEIGHT: 124 LBS | BODY MASS INDEX: 22.82 KG/M2 | DIASTOLIC BLOOD PRESSURE: 64 MMHG

## 2018-03-13 DIAGNOSIS — F02.80 LATE ONSET ALZHEIMER'S DISEASE WITHOUT BEHAVIORAL DISTURBANCE (HCC): Primary | ICD-10-CM

## 2018-03-13 DIAGNOSIS — G30.1 LATE ONSET ALZHEIMER'S DISEASE WITHOUT BEHAVIORAL DISTURBANCE (HCC): Primary | ICD-10-CM

## 2018-03-13 DIAGNOSIS — E11.9 CONTROLLED TYPE 2 DIABETES MELLITUS WITHOUT COMPLICATION, UNSPECIFIED LONG TERM INSULIN USE STATUS: ICD-10-CM

## 2018-03-13 PROCEDURE — 99214 OFFICE O/P EST MOD 30 MIN: CPT | Performed by: PSYCHIATRY & NEUROLOGY

## 2018-03-13 RX ORDER — BLOOD-GLUCOSE METER
KIT MISCELLANEOUS
Qty: 100 EACH | Refills: 5 | Status: SHIPPED | OUTPATIENT
Start: 2018-03-13 | End: 2018-03-13 | Stop reason: SDUPTHER

## 2018-03-13 RX ORDER — BLOOD-GLUCOSE METER
KIT MISCELLANEOUS
Qty: 150 EACH | Refills: 5 | Status: SHIPPED | OUTPATIENT
Start: 2018-03-13 | End: 2018-12-03 | Stop reason: SDUPTHER

## 2018-03-13 NOTE — PROGRESS NOTES
Subjective   Faustino Lopez is a 78 y.o. female.     Chief Complaint   Patient presents with   • Late onset Alzheimer's disease without behavioral disturbanc       History of Present Illness    Pt originally seen 6/14 for memory loss of about a year. Not noticed by patient. Dtr reported trouble with judgment, short term memory asking repetitive questions and making repetitive statements. Forgetting to pay bills --dtr had to take over. Lost a valuable check and didn't remember she'd received it. Also more trouble with learning how to use new appliances, organizing and planning, taking meds correctly (dtr now supervising meds, particularly insulin). Trouble finding her way back to table at a restaurant. Doesn't cook much -- eats out. Uses microwave.  Personality more withdrawn.    Normal B12, folate, TFTs, CT head. Started donepezil, then further worsening, had to stop driving, added Namenda and Lexapro. Noted need for 24 hr supervision and moved to [facility].  Nan reported dhe feels essentially unchanged cognitively and her daughter agreed. Her daughter continues to note that she is picking at her skin. She has also developed urinary and fecal incontinence. On 10mg daily, Namenda, and Lexapro 10mg daily. MMSE was 18/30, maybe influenced by visual impairment 2/2 macular degeneration. Planned increase of sertraline for picking at sores.   12/16: dtr notes continued problems with memory, judgment, WF, planning/organizing and orientation, but no difficulty with delusions or hallucinations. Also notes pt having more difficulty walking, more shuffling, and balance not as good.  Pt reports feeling fine.  Picking at sores again -- increased dose worked well until past month or two. Sleeping more past few months. Continued problems with bowel incontinence. Planned PT for balance, and increase Zoloft for compulsive picking. Saw Kellen Sandy 6/17, no changes noted, except MMSE 13/30, continued memantine and zoloft.  "  Today: Patient reports she is doing well and has no complaints.  Her daughter notes she continues to have difficulty with memory, judgment, word finding, learning, planning and organizing.  She recently moved to the memory care unit at [facility] and is doing well with the extra interaction and assistance.  Her daughter notes she seems to be prospering with more social interaction.  She is also using a walker now as her balance had continued to worsen despite PT.  Her daughter was concerned recently when the physician at [facility] planned to discontinue her donepezil due to intermittent diarrhea.  However her daughter notes that she's had problems with this off and on for decades, perhaps since starting metformin many years ago.  This has been tapered by Dr. Waller and she seems to be doing better.    She continues to pick at her skin, recently complicated by infection. However with more monitoring in the memory care unit she seems to be doing better with this as well.    The following portions of the patient's history were reviewed and updated as appropriate: allergies, current medications, past family history, past medical history, past social history, past surgical history and problem list.    Allergies   Allergen Reactions   • Erythromycin Hives     All Mycins.  Erythromycin Derivatives.       Current Outpatient Prescriptions on File Prior to Visit   Medication Sig Dispense Refill   • ANTI-DIARRHEAL 2 MG tablet Take 1 tablet by mouth 3 (Three) Times a Day As Needed.  5   • atorvastatin (LIPITOR) 40 MG tablet      • BD AUTOSHIELD DUO 30G X 5 MM misc   0   • Cholecalciferol (VITAMIN D3) 2000 UNITS tablet Take 1 tablet by mouth.     • Cinnamon 500 MG capsule Take 2 capsules by mouth 2 (Two) Times a Day.  6   • clotrimazole-betamethasone (LOTRISONE) 1-0.05 % cream      • donepezil (ARICEPT) 10 MG tablet Take  by mouth.     • EASY TOUCH INSULIN SAFETY SYR 29G X 1/2\" 1 ML misc INJECT WITH INSULIN FOUR " TIMES A DAY OR AS DIRECTED 100 each 5   • EASY TOUCH LANCETS 30G misc USE TO TEST BLOOD SUGAR FOUR TIMES A DAY *PHYSICIAN ORDER EXPIRES 2/24/16* 100 each 5   • EASYTEST LANCETS misc Test blood sugar QID or as directed 150 each 5   • glimepiride (AMARYL) 4 MG tablet Take  by mouth 2 (two) times a day.     • HUMALOG 100 UNIT/ML injection INJECT 12U SUBCUTANEOUSLY BEFORE BREAKFAST, 14U BEFORE LUNCH AND 22U BEFORE DINNER *DISCARD 28 DAYS AFTER OPENING* (Patient taking differently: INJECT 14U SUBCUTANEOUSLY BEFORE BREAKFAST, 16U BEFORE LUNCH AND 24U BEFORE DINNER *DISCARD 28 DAYS AFTER OPENING*) 10 mL 6   • HYDROcodone-acetaminophen (NORCO) 5-325 MG per tablet Take  by mouth.     • Insulin Glargine (LANTUS SOLOSTAR) 100 UNIT/ML injection pen Inject 16 Units under the skin Every Night.     • Insulin Pen Needle (B-D UF III MINI PEN NEEDLES) 31G X 5 MM misc      • Insulin Pen Needle (EASY TOUCH PEN NEEDLES) 31G X 5 MM misc      • levothyroxine (SYNTHROID, LEVOTHROID) 25 MCG tablet      • lisinopril (PRINIVIL,ZESTRIL) 5 MG tablet Take  by mouth daily.     • loratadine (CLARITIN) 10 MG tablet Take  by mouth.     • LORazepam (ATIVAN) 1 MG tablet   0   • memantine (NAMENDA) 10 MG tablet Take 1 tablet by mouth 2 (Two) Times a Day. 60 tablet 11   • Menthol-Zinc Oxide (CALMOSEPTINE) 0.44-20.6 % ointment Apply  topically 2 (Two) Times a Day.     • Multiple Vitamin tablet Take  by mouth.     • Multiple Vitamins-Minerals (PRESERVISION AREDS 2) capsule Take  by mouth.     • NAMENDA XR 28 MG capsule sustained-release 24 hr extended release capsule   7   • neomycin-bacitracin-polymyxin b (NEOSPORIN) 3.5-400-5000 ointment ointment   0   • NON-ASPIRIN PAIN RELIEF 325 MG tablet Take 2 tablets by mouth 4 (Four) Times a Day As Needed.  5   • Omega-3 Fatty Acids (FISH OIL) 1000 MG capsule capsule Take  by mouth.     • ondansetron (ZOFRAN) 4 MG tablet   0   • pseudoephedrine (SUDAFED) 30 MG tablet Take 30 mg by mouth as needed.       •  "pseudoephedrine (SUDAFED) 30 MG tablet Take 30 mg by mouth.     • saccharomyces boulardii (FLORASTOR) 250 MG capsule Take 1 capsule by mouth daily.     • sertraline (ZOLOFT) 100 MG tablet TAKE 1&1/2 TABLETS BY MOUTH AT BEDTIME 45 tablet 6   • [DISCONTINUED] EASYMAX TEST test strip USE TO TEST BLOOD SUGAR FOUR TIMES A DAY *PHYSICIAN ORDER EXPIRES 2/26/15 150 each 5   • metFORMIN (GLUCOPHAGE) 1000 MG tablet Take 1 tablet by mouth 2 (Two) Times a Day. 60 tablet 11   • metFORMIN (GLUCOPHAGE) 500 MG tablet        No current facility-administered medications on file prior to visit.        Past Medical History:   Diagnosis Date   • Alzheimer disease    • Benign colonic polyp    • Dementia     MEDICATION IN CHART FOR BUT PT STATES SHE DOESN'T KNOW OF BEING DXN   • Diabetes mellitus    • Diarrhea      03 Jul 2014  refer [facility] immodiumsome incontinence, nocturnal diarrhea   • History of migraine    • Hypertension    • Leg cramps    • Macular degeneration    • Vitamin D deficiency     13 Apr 2015  update vitamin D levels       Past Surgical History:   Procedure Laterality Date   • NO PAST SURGERIES      PT DENIES PAST SURGERIES   • OTHER SURGICAL HISTORY      Dental surgery       Social History     Social History   • Marital status:      Spouse name: N/A   • Number of children: N/A   • Years of education: N/A     Occupational History   • Not on file.     Social History Main Topics   • Smoking status: Never Smoker   • Smokeless tobacco: Never Used   • Alcohol use No   • Drug use: No   • Sexual activity: Defer     Other Topics Concern   • Not on file     Social History Narrative   • No narrative on file       Review of Systems   Constitutional: Negative for fever and unexpected weight change.   Respiratory: Negative for cough and shortness of breath.    Cardiovascular: Negative for chest pain.       Objective   Blood pressure 128/64, height 157.5 cm (62.01\"), weight 56.2 kg (124 lb).    Physical Exam   Constitutional: " She appears well-developed and well-nourished.   HENT:   Head: Normocephalic and atraumatic.   Eyes: EOM are normal. Pupils are equal, round, and reactive to light.   Pulmonary/Chest: Effort normal.   Musculoskeletal: Normal range of motion.   Neurological: She has a normal Finger-Nose-Finger Test and a normal Heel to Pruett Test.   Skin: Skin is warm and dry.   Psychiatric: Her speech is normal.   Nursing note and vitals reviewed.      Neurologic Exam     Mental Status   Oriented to person.   Oriented to place.   Disoriented to year, month, date and day.   Registration: recalls 3 of 3 objects. Recall at 5 minutes: recalls 1 of 3 objects. Follows 3 step commands.   Attention: decreased.   Speech: speech is normal   Level of consciousness: alert  Knowledge: poor and inconsistent with education.   Able to name object. Normal comprehension.   MMSE 16/30     Cranial Nerves     CN II   Visual fields full to confrontation.     CN III, IV, VI   Pupils are equal, round, and reactive to light.  Extraocular motions are normal.     CN VII   Facial expression full, symmetric.     CN IX, X   CN IX normal.   CN X normal.   Palate: symmetric    CN XI   CN XI normal.     CN XII   CN XII normal.   Impaired central vision more than peripheral     Motor Exam   Muscle bulk: normal  Right arm pronator drift: absent  Left arm pronator drift: absent    Strength   Strength 5/5 except as noted.     Gait, Coordination, and Reflexes     Gait  Gait: wide-based    Coordination   Finger to nose coordination: normal  Heel to shin coordination: normal    Tremor   Resting tremor: absent  Intention tremor: absent  Action tremor: absent      Assessment/Plan     Faustino was seen today for late onset alzheimer's disease without behavioral disturbanc.    Diagnoses and all orders for this visit:    Late onset Alzheimer's disease without behavioral disturbance      Discussion/Summary:  She is doing quite well with stable MMSE in the past year.  She has had  some decline in spatial ability and memory care unit seems appropriate.  Fortunately she is doing really well with the move and more interaction.  Her language and social skills are impressively intact and I agree with her daughter that discontinuing the donepezil would be a mistake unless there is no other cause for bowel problems and they are more than minor.  In that case we discussed we could try for instance Exelon patch which would not be ideal given her skin irritability, or Razadyne  28 minutes face-to-face, 18 minutes spent in discussion as above  Return in about 6 months (around 9/13/2018).

## 2018-03-17 ENCOUNTER — TELEPHONE (OUTPATIENT)
Dept: ENDOCRINOLOGY | Facility: CLINIC | Age: 78
End: 2018-03-17

## 2018-03-17 NOTE — TELEPHONE ENCOUNTER
Please let them know that I reviewed the faxed blood sugars   Pre dinner and bedtime levels are consistently elevated.  I would increase pre meal insulin for breakfast , lunch and dinner by 2 units.  Please have them send me another update in 1 week.  Thanks, cally

## 2018-03-19 NOTE — TELEPHONE ENCOUNTER
Note was faxed to [facility] facility where the patient resides with new insulin instructions

## 2018-05-22 RX ORDER — SYRINGE,NEEDLE,INSULN,SAFE,1ML 29 G X1/2"
SYRINGE, EMPTY DISPOSABLE MISCELLANEOUS
Qty: 100 EACH | Refills: 5 | Status: SHIPPED | OUTPATIENT
Start: 2018-05-22 | End: 2018-09-26 | Stop reason: SDUPTHER

## 2018-06-05 RX ORDER — MEMANTINE HYDROCHLORIDE 10 MG/1
TABLET ORAL
Qty: 60 TABLET | Refills: 11 | Status: SHIPPED | OUTPATIENT
Start: 2018-06-05 | End: 2019-07-02 | Stop reason: SDUPTHER

## 2018-09-26 RX ORDER — SERTRALINE HYDROCHLORIDE 100 MG/1
TABLET, FILM COATED ORAL
Qty: 45 TABLET | Refills: 7 | Status: SHIPPED | OUTPATIENT
Start: 2018-09-26 | End: 2019-07-02 | Stop reason: SDUPTHER

## 2018-09-26 RX ORDER — SYRINGE,NEEDLE,INSULN,SAFE,1ML 29 G X1/2"
SYRINGE, EMPTY DISPOSABLE MISCELLANEOUS
Qty: 100 EACH | Refills: 5 | Status: SHIPPED | OUTPATIENT
Start: 2018-09-26 | End: 2019-02-05 | Stop reason: SDUPTHER

## 2018-10-19 ENCOUNTER — OFFICE VISIT (OUTPATIENT)
Dept: NEUROLOGY | Facility: CLINIC | Age: 78
End: 2018-10-19

## 2018-10-19 VITALS
WEIGHT: 124 LBS | HEIGHT: 62 IN | BODY MASS INDEX: 22.82 KG/M2 | DIASTOLIC BLOOD PRESSURE: 62 MMHG | SYSTOLIC BLOOD PRESSURE: 126 MMHG

## 2018-10-19 DIAGNOSIS — R26.89 IMPAIRMENT OF BALANCE: Primary | ICD-10-CM

## 2018-10-19 DIAGNOSIS — G30.1 LATE ONSET ALZHEIMER'S DISEASE WITHOUT BEHAVIORAL DISTURBANCE (HCC): ICD-10-CM

## 2018-10-19 DIAGNOSIS — F02.80 LATE ONSET ALZHEIMER'S DISEASE WITHOUT BEHAVIORAL DISTURBANCE (HCC): ICD-10-CM

## 2018-10-19 PROCEDURE — 99214 OFFICE O/P EST MOD 30 MIN: CPT | Performed by: PHYSICIAN ASSISTANT

## 2018-10-19 RX ORDER — KETOTIFEN FUMARATE 0.25 MG/ML
SOLUTION/ DROPS OPHTHALMIC
Refills: 4 | COMMUNITY
Start: 2018-10-17

## 2018-10-19 RX ORDER — MV-MIN/FA/VIT K/LUTEIN/ZEAXANT 200MCG-5MG
CAPSULE ORAL
Refills: 5 | Status: ON HOLD | COMMUNITY
Start: 2018-10-02 | End: 2019-03-28

## 2018-10-19 NOTE — PROGRESS NOTES
Subjective     Chief Complaint: memory loss      History of Present Illness    Pt originally seen 6/14 for memory loss of about a year. Not noticed by patient. Dtr reported trouble with judgment, short term memory asking repetitive questions and making repetitive statements. Forgetting to pay bills --dtr had to take over. Lost a valuable check and didn't remember she'd received it. Also more trouble with learning how to use new appliances, organizing and planning, taking meds correctly (dtr now supervising meds, particularly insulin). Trouble finding her way back to table at a restaurant. Doesn't cook much -- eats out. Uses microwave.  Personality more withdrawn.    Normal B12, folate, TFTs, CT head. Started donepezil, then further worsening, had to stop driving, added Namenda and Lexapro. Noted need for 24 hr supervision and moved to [facility].  Nan reported dhe feels essentially unchanged cognitively and her daughter agreed. Her daughter continues to note that she is picking at her skin. She has also developed urinary and fecal incontinence. On 10mg daily, Namenda, and Lexapro 10mg daily. MMSE was 18/30, maybe influenced by visual impairment 2/2 macular degeneration. Planned increase of sertraline for picking at sores.   12/16: dtr notes continued problems with memory, judgment, WF, planning/organizing and orientation, but no difficulty with delusions or hallucinations. Also notes pt having more difficulty walking, more shuffling, and balance not as good.  Pt reports feeling fine.  Picking at sores again -- increased dose worked well until past month or two. Sleeping more past few months. Continued problems with bowel incontinence. Planned PT for balance, and increase Zoloft for compulsive picking. Saw Kellen Du 6/17, no changes noted, except MMSE 13/30, continued memantine and zoloft.   3/18: Patient reports she is doing well and has no complaints.  Her daughter notes she continues to have difficulty with  "memory, judgment, word finding, learning, planning and organizing.  She recently moved to the memory care unit at [facility] and is doing well with the extra interaction and assistance.  Her daughter notes she seems to be prospering with more social interaction.  She is also using a walker now as her balance had continued to worsen despite PT.  Her daughter was concerned recently when the physician at [facility] planned to discontinue her donepezil due to intermittent diarrhea.  However her daughter notes that she's had problems with this off and on for decades, perhaps since starting metformin many years ago.  This has been tapered by Dr. Waller and she seems to be doing better.    She continues to pick at her skin, recently complicated by infection. However with more monitoring in the memory care unit she seems to be doing better with this as well.  Today: Since her last visit in 3/18, she feels essentially unchanged cognitively and her family agrees. Her family has noted some continued balance impairment and shuffling gait, though denies any recent falls.        I have reviewed and confirmed the past family, social and medical history as accurate on 10/19/18.     Review of Systems   Constitutional: Negative.    HENT: Negative.    Eyes: Negative.    Respiratory: Negative.    Cardiovascular: Negative.    Gastrointestinal: Negative.    Endocrine: Negative.    Genitourinary: Negative.    Musculoskeletal: Negative.    Skin: Negative.    Allergic/Immunologic: Negative.    Neurological:        Memory loss    Hematological: Negative.    Psychiatric/Behavioral: Negative.        Objective     /62   Ht 157.5 cm (62.01\")   Wt 56.2 kg (124 lb)   BMI 22.67 kg/m²     General appearance today is normal.       Physical Exam   Neurological: She has normal strength. She has a normal Finger-Nose-Finger Test.   Psychiatric: Her speech is normal.        Neurologic Exam     Mental Status   Oriented to person. "   Oriented to place.   Disoriented to time.   Registration: recalls 3 of 3 objects. Recall of objects at 5 minutes: 0/3 recall. Follows 3 step commands.   Attention: decreased.   Speech: speech is normal   Level of consciousness: alert  Able to name object. Able to read. Able to repeat. Unable to write. Normal comprehension.     Cranial Nerves   Cranial nerves II through XII intact.     Motor Exam   Muscle bulk: normal  Overall muscle tone: normal    Strength   Strength 5/5 throughout.     Sensory Exam   Light touch normal.     Gait, Coordination, and Reflexes     Gait  Gait: (unsteady)    Coordination   Finger to nose coordination: normal    Tremor   Resting tremor: absent        Results  MMSE=13 (16 in 3/18)       Assessment/Plan   Faustino was seen today for alzheimer's disease.    Diagnoses and all orders for this visit:    Impairment of balance  -     Ambulatory Referral to Physical Therapy Evaluate and treat (balance impairment )    Late onset Alzheimer's disease without behavioral disturbance          Discussion/Summary   Faustino Lopez returns to clinic today for evaluation of Alzheimer's Disease . I again reviewed her current status and treatment options. After discussing potential treatment options, it was elected to continue on  donepezil, memantine and sertraline unchanged. I have also made a referral to PT for her balance impairment, which she will have performed at [facility]. She will then follow up in 6 months, or sooner if needed.   I spent 25 minutes minutes face to face with the patient and family with 15 minutes spent on discussing diagnosis, evaluation, current status, treatment options and management as discussed above.       As part of this visit I obtained additional history from the family which is incorporated in the HPI.      Kellen Sandy PA-C

## 2018-12-03 DIAGNOSIS — E11.9 CONTROLLED TYPE 2 DIABETES MELLITUS WITHOUT COMPLICATION (HCC): ICD-10-CM

## 2018-12-05 RX ORDER — BLOOD-GLUCOSE METER
KIT MISCELLANEOUS
Qty: 100 EACH | Refills: 5 | Status: ON HOLD | OUTPATIENT
Start: 2018-12-05 | End: 2019-03-28

## 2019-02-07 RX ORDER — SYRINGE,NEEDLE,INSULN,SAFE,1ML 29 G X1/2"
SYRINGE, EMPTY DISPOSABLE MISCELLANEOUS
Qty: 100 EACH | Refills: 5 | Status: ON HOLD | OUTPATIENT
Start: 2019-02-07 | End: 2019-03-28

## 2019-02-07 RX ORDER — SYRINGE,NEEDLE,INSULN,SAFE,1ML 29 G X1/2"
SYRINGE, EMPTY DISPOSABLE MISCELLANEOUS
Refills: 5 | OUTPATIENT
Start: 2019-02-07

## 2019-03-27 ENCOUNTER — APPOINTMENT (OUTPATIENT)
Dept: OTHER | Facility: HOSPITAL | Age: 79
End: 2019-03-27

## 2019-03-27 ENCOUNTER — APPOINTMENT (OUTPATIENT)
Dept: CT IMAGING | Facility: HOSPITAL | Age: 79
End: 2019-03-27

## 2019-03-27 ENCOUNTER — APPOINTMENT (OUTPATIENT)
Dept: GENERAL RADIOLOGY | Facility: HOSPITAL | Age: 79
End: 2019-03-27

## 2019-03-27 ENCOUNTER — HOSPITAL ENCOUNTER (INPATIENT)
Facility: HOSPITAL | Age: 79
LOS: 1 days | Discharge: NURSING, REHAB OR LONG-TERM CARE | End: 2019-03-29
Attending: EMERGENCY MEDICINE | Admitting: INTERNAL MEDICINE

## 2019-03-27 DIAGNOSIS — R65.10 SIRS (SYSTEMIC INFLAMMATORY RESPONSE SYNDROME) (HCC): ICD-10-CM

## 2019-03-27 DIAGNOSIS — R79.89 ELEVATED LFTS: ICD-10-CM

## 2019-03-27 DIAGNOSIS — Z74.09 IMPAIRED FUNCTIONAL MOBILITY, BALANCE, GAIT, AND ENDURANCE: ICD-10-CM

## 2019-03-27 DIAGNOSIS — Z86.59 HISTORY OF DEMENTIA: ICD-10-CM

## 2019-03-27 DIAGNOSIS — K85.90 ACUTE PANCREATITIS WITHOUT INFECTION OR NECROSIS, UNSPECIFIED PANCREATITIS TYPE: Primary | ICD-10-CM

## 2019-03-27 DIAGNOSIS — Z78.9 IMPAIRED MOBILITY AND ADLS: ICD-10-CM

## 2019-03-27 DIAGNOSIS — R73.9 HYPERGLYCEMIA: ICD-10-CM

## 2019-03-27 DIAGNOSIS — Z74.09 IMPAIRED MOBILITY AND ADLS: ICD-10-CM

## 2019-03-27 DIAGNOSIS — D72.829 LEUKOCYTOSIS, UNSPECIFIED TYPE: ICD-10-CM

## 2019-03-27 LAB
ALBUMIN SERPL-MCNC: 4.08 G/DL (ref 3.2–4.8)
ALBUMIN/GLOB SERPL: 2 G/DL (ref 1.5–2.5)
ALP SERPL-CCNC: 200 U/L (ref 25–100)
ALT SERPL W P-5'-P-CCNC: 742 U/L (ref 7–40)
ANION GAP SERPL CALCULATED.3IONS-SCNC: 12 MMOL/L (ref 3–11)
ARTERIAL PATENCY WRIST A: ABNORMAL
AST SERPL-CCNC: 229 U/L (ref 0–33)
ATMOSPHERIC PRESS: ABNORMAL MMHG
BACTERIA UR QL AUTO: ABNORMAL /HPF
BASE EXCESS BLDA CALC-SCNC: 0.6 MMOL/L (ref 0–2)
BASOPHILS # BLD MANUAL: 0 10*3/MM3 (ref 0–0.2)
BASOPHILS NFR BLD AUTO: 0 % (ref 0–1)
BDY SITE: ABNORMAL
BILIRUB SERPL-MCNC: 0.9 MG/DL (ref 0.3–1.2)
BILIRUB UR QL STRIP: NEGATIVE
BODY TEMPERATURE: 37 C
BUN BLD-MCNC: 23 MG/DL (ref 9–23)
BUN/CREAT SERPL: 24.5 (ref 7–25)
BURR CELLS BLD QL SMEAR: ABNORMAL
CALCIUM SPEC-SCNC: 9.1 MG/DL (ref 8.7–10.4)
CHLORIDE SERPL-SCNC: 102 MMOL/L (ref 99–109)
CLARITY UR: ABNORMAL
CO2 BLDA-SCNC: 28.2 MMOL/L (ref 23–27)
CO2 SERPL-SCNC: 24 MMOL/L (ref 20–31)
COHGB MFR BLD: 1.2 % (ref 0–2)
COLOR UR: YELLOW
CREAT BLD-MCNC: 0.94 MG/DL (ref 0.6–1.3)
D-LACTATE SERPL-SCNC: 2.5 MMOL/L (ref 0.5–2)
DEPRECATED RDW RBC AUTO: 47.5 FL (ref 37–54)
EOSINOPHIL # BLD MANUAL: 0 10*3/MM3 (ref 0.1–0.3)
EOSINOPHIL NFR BLD MANUAL: 0 % (ref 0–3)
ERYTHROCYTE [DISTWIDTH] IN BLOOD BY AUTOMATED COUNT: 14.3 % (ref 11.3–14.5)
FLUAV AG NPH QL: NEGATIVE
FLUBV AG NPH QL IA: NEGATIVE
GFR SERPL CREATININE-BSD FRML MDRD: 57 ML/MIN/1.73
GLOBULIN UR ELPH-MCNC: 2 GM/DL
GLUCOSE BLD-MCNC: 444 MG/DL (ref 70–100)
GLUCOSE BLDC GLUCOMTR-MCNC: 474 MG/DL (ref 70–130)
GLUCOSE UR STRIP-MCNC: ABNORMAL MG/DL
HCO3 BLDA-SCNC: 26.7 MMOL/L (ref 20–26)
HCT VFR BLD AUTO: 39 % (ref 34.5–44)
HCT VFR BLD CALC: 40 %
HGB BLD-MCNC: 12.5 G/DL (ref 11.5–15.5)
HGB BLDA-MCNC: 13 G/DL (ref 14–18)
HGB UR QL STRIP.AUTO: ABNORMAL
HOLD SPECIMEN: NORMAL
HOROWITZ INDEX BLD+IHG-RTO: 21 %
HYALINE CASTS UR QL AUTO: ABNORMAL /LPF
INR PPP: 1.35 (ref 0.85–1.16)
KETONES UR QL STRIP: NEGATIVE
LEUKOCYTE ESTERASE UR QL STRIP.AUTO: NEGATIVE
LYMPHOCYTES # BLD MANUAL: 12.49 10*3/MM3 (ref 0.6–4.8)
LYMPHOCYTES NFR BLD MANUAL: 2 % (ref 0–12)
LYMPHOCYTES NFR BLD MANUAL: 46 % (ref 24–44)
Lab: ABNORMAL
MAGNESIUM SERPL-MCNC: 2.3 MG/DL (ref 1.3–2.7)
MCH RBC QN AUTO: 29.1 PG (ref 27–31)
MCHC RBC AUTO-ENTMCNC: 32.1 G/DL (ref 32–36)
MCV RBC AUTO: 90.9 FL (ref 80–99)
METHGB BLD QL: 0.7 % (ref 0–1.5)
MODALITY: ABNORMAL
MONOCYTES # BLD AUTO: 0.54 10*3/MM3 (ref 0–1)
NEUTROPHILS # BLD AUTO: 12.49 10*3/MM3 (ref 1.5–8.3)
NEUTROPHILS NFR BLD MANUAL: 40 % (ref 41–71)
NEUTS BAND NFR BLD MANUAL: 6 % (ref 0–5)
NITRITE UR QL STRIP: NEGATIVE
NOTE: ABNORMAL
NOTIFIED BY: ABNORMAL
NOTIFIED WHO: ABNORMAL
OXYHGB MFR BLDV: 18 % (ref 94–99)
PCO2 BLDA: 48.1 MM HG (ref 35–45)
PCO2 TEMP ADJ BLD: 48.1 MM HG (ref 35–45)
PH BLDA: 7.35 PH UNITS (ref 7.35–7.45)
PH UR STRIP.AUTO: 6 [PH] (ref 5–8)
PH, TEMP CORRECTED: 7.35 PH UNITS
PLAT MORPH BLD: NORMAL
PLATELET # BLD AUTO: 158 10*3/MM3 (ref 150–450)
PMV BLD AUTO: 10.3 FL (ref 6–12)
PO2 BLDA: 14.7 MM HG (ref 83–108)
PO2 TEMP ADJ BLD: 14.7 MM HG (ref 83–108)
POTASSIUM BLD-SCNC: 4.5 MMOL/L (ref 3.5–5.5)
PROCALCITONIN SERPL-MCNC: 2.15 NG/ML
PROT SERPL-MCNC: 6.1 G/DL (ref 5.7–8.2)
PROT UR QL STRIP: ABNORMAL
PROTHROMBIN TIME: 16 SECONDS (ref 11.2–14.3)
RBC # BLD AUTO: 4.29 10*6/MM3 (ref 3.89–5.14)
RBC # UR: ABNORMAL /HPF
REF LAB TEST METHOD: ABNORMAL
SODIUM BLD-SCNC: 138 MMOL/L (ref 132–146)
SP GR UR STRIP: 1.01 (ref 1–1.03)
SQUAMOUS #/AREA URNS HPF: ABNORMAL /HPF
T4 FREE SERPL-MCNC: 0.99 NG/DL (ref 0.89–1.76)
TRANS CELLS #/AREA URNS HPF: ABNORMAL /HPF
TSH SERPL DL<=0.05 MIU/L-ACNC: 2.33 MIU/ML (ref 0.35–5.35)
UROBILINOGEN UR QL STRIP: ABNORMAL
VARIANT LYMPHS NFR BLD MANUAL: 6 % (ref 0–5)
VENTILATOR MODE: ABNORMAL
WBC MORPH BLD: NORMAL
WBC NRBC COR # BLD: 27.15 10*3/MM3 (ref 3.5–10.8)
WBC UR QL AUTO: ABNORMAL /HPF
WHOLE BLOOD HOLD SPECIMEN: NORMAL
WHOLE BLOOD HOLD SPECIMEN: NORMAL
YEAST URNS QL MICRO: ABNORMAL /HPF

## 2019-03-27 PROCEDURE — 80061 LIPID PANEL: CPT | Performed by: INTERNAL MEDICINE

## 2019-03-27 PROCEDURE — 80074 ACUTE HEPATITIS PANEL: CPT | Performed by: EMERGENCY MEDICINE

## 2019-03-27 PROCEDURE — 99285 EMERGENCY DEPT VISIT HI MDM: CPT

## 2019-03-27 PROCEDURE — 84439 ASSAY OF FREE THYROXINE: CPT | Performed by: EMERGENCY MEDICINE

## 2019-03-27 PROCEDURE — 82805 BLOOD GASES W/O2 SATURATION: CPT

## 2019-03-27 PROCEDURE — 82962 GLUCOSE BLOOD TEST: CPT

## 2019-03-27 PROCEDURE — 85007 BL SMEAR W/DIFF WBC COUNT: CPT | Performed by: EMERGENCY MEDICINE

## 2019-03-27 PROCEDURE — 81001 URINALYSIS AUTO W/SCOPE: CPT | Performed by: EMERGENCY MEDICINE

## 2019-03-27 PROCEDURE — 87186 SC STD MICRODIL/AGAR DIL: CPT | Performed by: INTERNAL MEDICINE

## 2019-03-27 PROCEDURE — 83735 ASSAY OF MAGNESIUM: CPT | Performed by: EMERGENCY MEDICINE

## 2019-03-27 PROCEDURE — 85610 PROTHROMBIN TIME: CPT | Performed by: EMERGENCY MEDICINE

## 2019-03-27 PROCEDURE — 84443 ASSAY THYROID STIM HORMONE: CPT | Performed by: EMERGENCY MEDICINE

## 2019-03-27 PROCEDURE — 84145 PROCALCITONIN (PCT): CPT | Performed by: EMERGENCY MEDICINE

## 2019-03-27 PROCEDURE — 87040 BLOOD CULTURE FOR BACTERIA: CPT | Performed by: EMERGENCY MEDICINE

## 2019-03-27 PROCEDURE — 87804 INFLUENZA ASSAY W/OPTIC: CPT | Performed by: EMERGENCY MEDICINE

## 2019-03-27 PROCEDURE — 87077 CULTURE AEROBIC IDENTIFY: CPT | Performed by: INTERNAL MEDICINE

## 2019-03-27 PROCEDURE — 93005 ELECTROCARDIOGRAM TRACING: CPT | Performed by: EMERGENCY MEDICINE

## 2019-03-27 PROCEDURE — 71045 X-RAY EXAM CHEST 1 VIEW: CPT

## 2019-03-27 PROCEDURE — 87147 CULTURE TYPE IMMUNOLOGIC: CPT | Performed by: EMERGENCY MEDICINE

## 2019-03-27 PROCEDURE — 70450 CT HEAD/BRAIN W/O DYE: CPT

## 2019-03-27 PROCEDURE — 74177 CT ABD & PELVIS W/CONTRAST: CPT

## 2019-03-27 PROCEDURE — 83605 ASSAY OF LACTIC ACID: CPT | Performed by: EMERGENCY MEDICINE

## 2019-03-27 PROCEDURE — 80053 COMPREHEN METABOLIC PANEL: CPT | Performed by: EMERGENCY MEDICINE

## 2019-03-27 PROCEDURE — 85025 COMPLETE CBC W/AUTO DIFF WBC: CPT | Performed by: EMERGENCY MEDICINE

## 2019-03-27 PROCEDURE — 83690 ASSAY OF LIPASE: CPT | Performed by: EMERGENCY MEDICINE

## 2019-03-27 PROCEDURE — 25010000002 IOPAMIDOL 61 % SOLUTION: Performed by: EMERGENCY MEDICINE

## 2019-03-27 PROCEDURE — 84484 ASSAY OF TROPONIN QUANT: CPT | Performed by: EMERGENCY MEDICINE

## 2019-03-27 PROCEDURE — 36600 WITHDRAWAL OF ARTERIAL BLOOD: CPT

## 2019-03-27 PROCEDURE — 87150 DNA/RNA AMPLIFIED PROBE: CPT | Performed by: EMERGENCY MEDICINE

## 2019-03-27 PROCEDURE — 87086 URINE CULTURE/COLONY COUNT: CPT | Performed by: INTERNAL MEDICINE

## 2019-03-27 RX ORDER — SODIUM CHLORIDE 0.9 % (FLUSH) 0.9 %
10 SYRINGE (ML) INJECTION AS NEEDED
Status: DISCONTINUED | OUTPATIENT
Start: 2019-03-27 | End: 2019-03-29 | Stop reason: HOSPADM

## 2019-03-27 RX ADMIN — IOPAMIDOL 70 ML: 612 INJECTION, SOLUTION INTRAVENOUS at 23:55

## 2019-03-27 RX ADMIN — SODIUM CHLORIDE 1000 ML: 9 INJECTION, SOLUTION INTRAVENOUS at 23:00

## 2019-03-28 ENCOUNTER — APPOINTMENT (OUTPATIENT)
Dept: ULTRASOUND IMAGING | Facility: HOSPITAL | Age: 79
End: 2019-03-28

## 2019-03-28 ENCOUNTER — TELEPHONE (OUTPATIENT)
Dept: INTERNAL MEDICINE | Facility: CLINIC | Age: 79
End: 2019-03-28

## 2019-03-28 PROBLEM — A41.9 SEPSIS (HCC): Status: ACTIVE | Noted: 2019-03-28

## 2019-03-28 PROBLEM — N39.0 ACUTE UTI (URINARY TRACT INFECTION): Status: ACTIVE | Noted: 2019-03-28

## 2019-03-28 PROBLEM — J96.01 ACUTE RESPIRATORY FAILURE WITH HYPOXIA (HCC): Status: ACTIVE | Noted: 2019-03-28

## 2019-03-28 PROBLEM — K85.90 PANCREATITIS: Status: ACTIVE | Noted: 2019-03-28

## 2019-03-28 PROBLEM — R74.01 TRANSAMINITIS: Status: ACTIVE | Noted: 2019-03-28

## 2019-03-28 PROBLEM — E11.9 T2DM (TYPE 2 DIABETES MELLITUS) (HCC): Status: ACTIVE | Noted: 2019-03-28

## 2019-03-28 LAB
ALBUMIN SERPL-MCNC: 3.57 G/DL (ref 3.2–4.8)
ALBUMIN/GLOB SERPL: 2.1 G/DL (ref 1.5–2.5)
ALP SERPL-CCNC: 165 U/L (ref 25–100)
ALT SERPL W P-5'-P-CCNC: 575 U/L (ref 7–40)
ANION GAP SERPL CALCULATED.3IONS-SCNC: 9 MMOL/L (ref 3–11)
ARTICHOKE IGE QN: 61 MG/DL (ref 0–130)
AST SERPL-CCNC: 158 U/L (ref 0–33)
BASOPHILS # BLD AUTO: 0.02 10*3/MM3 (ref 0–0.2)
BASOPHILS NFR BLD AUTO: 0.1 % (ref 0–1)
BILIRUB SERPL-MCNC: 1 MG/DL (ref 0.3–1.2)
BUN BLD-MCNC: 18 MG/DL (ref 9–23)
BUN/CREAT SERPL: 23.4 (ref 7–25)
CALCIUM SPEC-SCNC: 8.3 MG/DL (ref 8.7–10.4)
CHLORIDE SERPL-SCNC: 108 MMOL/L (ref 99–109)
CHOLEST SERPL-MCNC: 119 MG/DL (ref 0–200)
CO2 SERPL-SCNC: 22 MMOL/L (ref 20–31)
CREAT BLD-MCNC: 0.77 MG/DL (ref 0.6–1.3)
D-LACTATE SERPL-SCNC: 0.8 MMOL/L (ref 0.5–2)
DEPRECATED RDW RBC AUTO: 47.2 FL (ref 37–54)
EOSINOPHIL # BLD AUTO: 0.01 10*3/MM3 (ref 0–0.3)
EOSINOPHIL NFR BLD AUTO: 0 % (ref 0–3)
ERYTHROCYTE [DISTWIDTH] IN BLOOD BY AUTOMATED COUNT: 14.3 % (ref 11.3–14.5)
GFR SERPL CREATININE-BSD FRML MDRD: 72 ML/MIN/1.73
GLOBULIN UR ELPH-MCNC: 1.7 GM/DL
GLUCOSE BLD-MCNC: 330 MG/DL (ref 70–100)
GLUCOSE BLDC GLUCOMTR-MCNC: 198 MG/DL (ref 70–130)
GLUCOSE BLDC GLUCOMTR-MCNC: 256 MG/DL (ref 70–130)
GLUCOSE BLDC GLUCOMTR-MCNC: 284 MG/DL (ref 70–130)
GLUCOSE BLDC GLUCOMTR-MCNC: 301 MG/DL (ref 70–130)
GLUCOSE BLDC GLUCOMTR-MCNC: 345 MG/DL (ref 70–130)
GLUCOSE BLDC GLUCOMTR-MCNC: 352 MG/DL (ref 70–130)
HAV IGM SERPL QL IA: NORMAL
HBA1C MFR BLD: 9.1 % (ref 4.8–5.6)
HBV CORE IGM SERPL QL IA: NORMAL
HBV SURFACE AG SERPL QL IA: NORMAL
HCT VFR BLD AUTO: 35.4 % (ref 34.5–44)
HCV AB SER DONR QL: NORMAL
HDLC SERPL-MCNC: 42 MG/DL (ref 40–60)
HGB BLD-MCNC: 11.4 G/DL (ref 11.5–15.5)
HOLD SPECIMEN: NORMAL
IMM GRANULOCYTES # BLD AUTO: 0.07 10*3/MM3 (ref 0–0.05)
IMM GRANULOCYTES NFR BLD AUTO: 0.3 % (ref 0–0.6)
LIPASE SERPL-CCNC: 419 U/L (ref 6–51)
LIPASE SERPL-CCNC: 673 U/L (ref 6–51)
LYMPHOCYTES # BLD AUTO: 10.97 10*3/MM3 (ref 0.6–4.8)
LYMPHOCYTES NFR BLD AUTO: 48.7 % (ref 24–44)
MAGNESIUM SERPL-MCNC: 2.2 MG/DL (ref 1.3–2.7)
MCH RBC QN AUTO: 29.4 PG (ref 27–31)
MCHC RBC AUTO-ENTMCNC: 32.2 G/DL (ref 32–36)
MCV RBC AUTO: 91.2 FL (ref 80–99)
MONOCYTES # BLD AUTO: 0.9 10*3/MM3 (ref 0–1)
MONOCYTES NFR BLD AUTO: 4 % (ref 0–12)
NEUTROPHILS # BLD AUTO: 10.61 10*3/MM3 (ref 1.5–8.3)
NEUTROPHILS NFR BLD AUTO: 47.2 % (ref 41–71)
PLAT MORPH BLD: NORMAL
PLATELET # BLD AUTO: 130 10*3/MM3 (ref 150–450)
PMV BLD AUTO: 10.4 FL (ref 6–12)
POTASSIUM BLD-SCNC: 4 MMOL/L (ref 3.5–5.5)
PROT SERPL-MCNC: 5.3 G/DL (ref 5.7–8.2)
RBC # BLD AUTO: 3.88 10*6/MM3 (ref 3.89–5.14)
RBC MORPH BLD: NORMAL
SMUDGE CELLS BLD QL SMEAR: NORMAL
SODIUM BLD-SCNC: 139 MMOL/L (ref 132–146)
TRIGL SERPL-MCNC: 115 MG/DL (ref 0–150)
TROPONIN I SERPL-MCNC: <0.006 NG/ML
WBC NRBC COR # BLD: 22.51 10*3/MM3 (ref 3.5–10.8)

## 2019-03-28 PROCEDURE — 25010000002 PIPERACILLIN SOD-TAZOBACTAM PER 1 G: Performed by: EMERGENCY MEDICINE

## 2019-03-28 PROCEDURE — 63710000001 INSULIN LISPRO (HUMAN) PER 5 UNITS: Performed by: NURSE PRACTITIONER

## 2019-03-28 PROCEDURE — 76705 ECHO EXAM OF ABDOMEN: CPT

## 2019-03-28 PROCEDURE — 99222 1ST HOSP IP/OBS MODERATE 55: CPT | Performed by: INTERNAL MEDICINE

## 2019-03-28 PROCEDURE — 63710000001 INSULIN DETEMIR PER 5 UNITS: Performed by: INTERNAL MEDICINE

## 2019-03-28 PROCEDURE — 85025 COMPLETE CBC W/AUTO DIFF WBC: CPT | Performed by: NURSE PRACTITIONER

## 2019-03-28 PROCEDURE — 82962 GLUCOSE BLOOD TEST: CPT

## 2019-03-28 PROCEDURE — 80053 COMPREHEN METABOLIC PANEL: CPT | Performed by: NURSE PRACTITIONER

## 2019-03-28 PROCEDURE — 83036 HEMOGLOBIN GLYCOSYLATED A1C: CPT | Performed by: NURSE PRACTITIONER

## 2019-03-28 PROCEDURE — 25010000002 VANCOMYCIN 10 G RECONSTITUTED SOLUTION: Performed by: EMERGENCY MEDICINE

## 2019-03-28 PROCEDURE — 85007 BL SMEAR W/DIFF WBC COUNT: CPT | Performed by: NURSE PRACTITIONER

## 2019-03-28 PROCEDURE — 25010000002 VITAMIN K1 PER 1 MG: Performed by: INTERNAL MEDICINE

## 2019-03-28 PROCEDURE — 83690 ASSAY OF LIPASE: CPT | Performed by: NURSE PRACTITIONER

## 2019-03-28 PROCEDURE — 99223 1ST HOSP IP/OBS HIGH 75: CPT | Performed by: INTERNAL MEDICINE

## 2019-03-28 PROCEDURE — 97162 PT EVAL MOD COMPLEX 30 MIN: CPT

## 2019-03-28 PROCEDURE — 83735 ASSAY OF MAGNESIUM: CPT | Performed by: NURSE PRACTITIONER

## 2019-03-28 PROCEDURE — 25010000002 HEPARIN (PORCINE) PER 1000 UNITS: Performed by: NURSE PRACTITIONER

## 2019-03-28 PROCEDURE — 83605 ASSAY OF LACTIC ACID: CPT | Performed by: EMERGENCY MEDICINE

## 2019-03-28 PROCEDURE — 25010000002 PIPERACILLIN SOD-TAZOBACTAM PER 1 G: Performed by: NURSE PRACTITIONER

## 2019-03-28 RX ORDER — SODIUM CHLORIDE 0.9 % (FLUSH) 0.9 %
3 SYRINGE (ML) INJECTION EVERY 12 HOURS SCHEDULED
Status: DISCONTINUED | OUTPATIENT
Start: 2019-03-28 | End: 2019-03-29 | Stop reason: HOSPADM

## 2019-03-28 RX ORDER — SODIUM CHLORIDE 9 MG/ML
100 INJECTION, SOLUTION INTRAVENOUS CONTINUOUS
Status: DISCONTINUED | OUTPATIENT
Start: 2019-03-28 | End: 2019-03-29 | Stop reason: HOSPADM

## 2019-03-28 RX ORDER — CHOLECALCIFEROL (VITAMIN D3) 125 MCG
5 CAPSULE ORAL NIGHTLY PRN
Status: DISCONTINUED | OUTPATIENT
Start: 2019-03-28 | End: 2019-03-29 | Stop reason: HOSPADM

## 2019-03-28 RX ORDER — ATORVASTATIN CALCIUM 40 MG/1
40 TABLET, FILM COATED ORAL NIGHTLY
Status: DISCONTINUED | OUTPATIENT
Start: 2019-03-28 | End: 2019-03-28

## 2019-03-28 RX ORDER — SERTRALINE HYDROCHLORIDE 100 MG/1
100 TABLET, FILM COATED ORAL NIGHTLY
Status: DISCONTINUED | OUTPATIENT
Start: 2019-03-28 | End: 2019-03-29 | Stop reason: HOSPADM

## 2019-03-28 RX ORDER — NICOTINE POLACRILEX 4 MG
15 LOZENGE BUCCAL
Status: DISCONTINUED | OUTPATIENT
Start: 2019-03-28 | End: 2019-03-29 | Stop reason: HOSPADM

## 2019-03-28 RX ORDER — CETIRIZINE HYDROCHLORIDE 10 MG/1
10 TABLET ORAL DAILY
Status: DISCONTINUED | OUTPATIENT
Start: 2019-03-28 | End: 2019-03-29 | Stop reason: HOSPADM

## 2019-03-28 RX ORDER — HYDROMORPHONE HYDROCHLORIDE 1 MG/ML
0.25 INJECTION, SOLUTION INTRAMUSCULAR; INTRAVENOUS; SUBCUTANEOUS EVERY 4 HOURS PRN
Status: DISCONTINUED | OUTPATIENT
Start: 2019-03-28 | End: 2019-03-29 | Stop reason: HOSPADM

## 2019-03-28 RX ORDER — MEMANTINE HYDROCHLORIDE 10 MG/1
10 TABLET ORAL 2 TIMES DAILY
Status: DISCONTINUED | OUTPATIENT
Start: 2019-03-28 | End: 2019-03-29 | Stop reason: HOSPADM

## 2019-03-28 RX ORDER — DEXTROSE MONOHYDRATE 25 G/50ML
25 INJECTION, SOLUTION INTRAVENOUS
Status: DISCONTINUED | OUTPATIENT
Start: 2019-03-28 | End: 2019-03-29 | Stop reason: HOSPADM

## 2019-03-28 RX ORDER — SODIUM CHLORIDE 0.9 % (FLUSH) 0.9 %
3-10 SYRINGE (ML) INJECTION AS NEEDED
Status: DISCONTINUED | OUTPATIENT
Start: 2019-03-28 | End: 2019-03-29 | Stop reason: HOSPADM

## 2019-03-28 RX ORDER — DONEPEZIL HYDROCHLORIDE 10 MG/1
10 TABLET, FILM COATED ORAL NIGHTLY
Status: DISCONTINUED | OUTPATIENT
Start: 2019-03-28 | End: 2019-03-29 | Stop reason: HOSPADM

## 2019-03-28 RX ORDER — HEPARIN SODIUM 5000 [USP'U]/ML
5000 INJECTION, SOLUTION INTRAVENOUS; SUBCUTANEOUS EVERY 8 HOURS SCHEDULED
Status: DISCONTINUED | OUTPATIENT
Start: 2019-03-28 | End: 2019-03-29 | Stop reason: HOSPADM

## 2019-03-28 RX ADMIN — TAZOBACTAM SODIUM AND PIPERACILLIN SODIUM 3.38 G: 375; 3 INJECTION, SOLUTION INTRAVENOUS at 14:43

## 2019-03-28 RX ADMIN — HEPARIN SODIUM 5000 UNITS: 5000 INJECTION INTRAVENOUS; SUBCUTANEOUS at 06:07

## 2019-03-28 RX ADMIN — MEMANTINE 10 MG: 10 TABLET ORAL at 21:08

## 2019-03-28 RX ADMIN — VANCOMYCIN HYDROCHLORIDE 1250 MG: 10 INJECTION, POWDER, LYOPHILIZED, FOR SOLUTION INTRAVENOUS at 01:05

## 2019-03-28 RX ADMIN — INSULIN DETEMIR 8 UNITS: 100 INJECTION, SOLUTION SUBCUTANEOUS at 22:24

## 2019-03-28 RX ADMIN — TAZOBACTAM SODIUM AND PIPERACILLIN SODIUM 3.38 G: 375; 3 INJECTION, SOLUTION INTRAVENOUS at 00:37

## 2019-03-28 RX ADMIN — SODIUM CHLORIDE, PRESERVATIVE FREE 3 ML: 5 INJECTION INTRAVENOUS at 11:02

## 2019-03-28 RX ADMIN — INSULIN LISPRO 2 UNITS: 100 INJECTION, SOLUTION INTRAVENOUS; SUBCUTANEOUS at 11:01

## 2019-03-28 RX ADMIN — INSULIN LISPRO 4 UNITS: 100 INJECTION, SOLUTION INTRAVENOUS; SUBCUTANEOUS at 17:35

## 2019-03-28 RX ADMIN — HEPARIN SODIUM 5000 UNITS: 5000 INJECTION INTRAVENOUS; SUBCUTANEOUS at 14:43

## 2019-03-28 RX ADMIN — INSULIN LISPRO 6 UNITS: 100 INJECTION, SOLUTION INTRAVENOUS; SUBCUTANEOUS at 04:35

## 2019-03-28 RX ADMIN — SODIUM CHLORIDE 100 ML/HR: 9 INJECTION, SOLUTION INTRAVENOUS at 03:17

## 2019-03-28 RX ADMIN — INSULIN LISPRO 4 UNITS: 100 INJECTION, SOLUTION INTRAVENOUS; SUBCUTANEOUS at 12:27

## 2019-03-28 RX ADMIN — SERTRALINE HYDROCHLORIDE 100 MG: 100 TABLET ORAL at 21:08

## 2019-03-28 RX ADMIN — DONEPEZIL HYDROCHLORIDE 10 MG: 10 TABLET, FILM COATED ORAL at 21:08

## 2019-03-28 RX ADMIN — HEPARIN SODIUM 5000 UNITS: 5000 INJECTION INTRAVENOUS; SUBCUTANEOUS at 21:08

## 2019-03-28 RX ADMIN — TAZOBACTAM SODIUM AND PIPERACILLIN SODIUM 3.38 G: 375; 3 INJECTION, SOLUTION INTRAVENOUS at 06:00

## 2019-03-28 RX ADMIN — INSULIN LISPRO 5 UNITS: 100 INJECTION, SOLUTION INTRAVENOUS; SUBCUTANEOUS at 21:29

## 2019-03-28 RX ADMIN — PHYTONADIONE 10 MG: 10 INJECTION, EMULSION INTRAMUSCULAR; INTRAVENOUS; SUBCUTANEOUS at 21:08

## 2019-03-28 RX ADMIN — TAZOBACTAM SODIUM AND PIPERACILLIN SODIUM 3.38 G: 375; 3 INJECTION, SOLUTION INTRAVENOUS at 22:24

## 2019-03-28 RX ADMIN — MEMANTINE 10 MG: 10 TABLET ORAL at 11:01

## 2019-03-28 RX ADMIN — CETIRIZINE HYDROCHLORIDE 10 MG: 10 TABLET, FILM COATED ORAL at 11:01

## 2019-03-29 ENCOUNTER — APPOINTMENT (OUTPATIENT)
Dept: MRI IMAGING | Facility: HOSPITAL | Age: 79
End: 2019-03-29

## 2019-03-29 VITALS
DIASTOLIC BLOOD PRESSURE: 59 MMHG | SYSTOLIC BLOOD PRESSURE: 114 MMHG | HEIGHT: 65 IN | HEART RATE: 70 BPM | TEMPERATURE: 98.2 F | RESPIRATION RATE: 16 BRPM | WEIGHT: 134.8 LBS | OXYGEN SATURATION: 97 % | BODY MASS INDEX: 22.46 KG/M2

## 2019-03-29 LAB
ALBUMIN SERPL-MCNC: 3.28 G/DL (ref 3.2–4.8)
ALBUMIN/GLOB SERPL: 1.9 G/DL (ref 1.5–2.5)
ALP SERPL-CCNC: 174 U/L (ref 25–100)
ALT SERPL W P-5'-P-CCNC: 337 U/L (ref 7–40)
ANION GAP SERPL CALCULATED.3IONS-SCNC: 8 MMOL/L (ref 3–11)
AST SERPL-CCNC: 72 U/L (ref 0–33)
BILIRUB CONJ SERPL-MCNC: 0.3 MG/DL (ref 0–0.2)
BILIRUB SERPL-MCNC: 0.8 MG/DL (ref 0.3–1.2)
BUN BLD-MCNC: 11 MG/DL (ref 9–23)
BUN/CREAT SERPL: 19 (ref 7–25)
CALCIUM SPEC-SCNC: 8.4 MG/DL (ref 8.7–10.4)
CHLORIDE SERPL-SCNC: 108 MMOL/L (ref 99–109)
CO2 SERPL-SCNC: 19 MMOL/L (ref 20–31)
CREAT BLD-MCNC: 0.58 MG/DL (ref 0.6–1.3)
DEPRECATED RDW RBC AUTO: 47.8 FL (ref 37–54)
ERYTHROCYTE [DISTWIDTH] IN BLOOD BY AUTOMATED COUNT: 14.2 % (ref 11.3–14.5)
GFR SERPL CREATININE-BSD FRML MDRD: 100 ML/MIN/1.73
GLOBULIN UR ELPH-MCNC: 1.7 GM/DL
GLUCOSE BLD-MCNC: 209 MG/DL (ref 70–100)
GLUCOSE BLDC GLUCOMTR-MCNC: 159 MG/DL (ref 70–130)
GLUCOSE BLDC GLUCOMTR-MCNC: 182 MG/DL (ref 70–130)
HCT VFR BLD AUTO: 34.7 % (ref 34.5–44)
HGB BLD-MCNC: 11 G/DL (ref 11.5–15.5)
INR PPP: 1.3 (ref 0.85–1.16)
MCH RBC QN AUTO: 29.1 PG (ref 27–31)
MCHC RBC AUTO-ENTMCNC: 31.7 G/DL (ref 32–36)
MCV RBC AUTO: 91.8 FL (ref 80–99)
PLATELET # BLD AUTO: 108 10*3/MM3 (ref 150–450)
PMV BLD AUTO: 10.4 FL (ref 6–12)
POTASSIUM BLD-SCNC: 3.5 MMOL/L (ref 3.5–5.5)
PROT SERPL-MCNC: 5 G/DL (ref 5.7–8.2)
PROTHROMBIN TIME: 15.6 SECONDS (ref 11.2–14.3)
RBC # BLD AUTO: 3.78 10*6/MM3 (ref 3.89–5.14)
SODIUM BLD-SCNC: 135 MMOL/L (ref 132–146)
WBC NRBC COR # BLD: 15.16 10*3/MM3 (ref 3.5–10.8)

## 2019-03-29 PROCEDURE — 85610 PROTHROMBIN TIME: CPT | Performed by: INTERNAL MEDICINE

## 2019-03-29 PROCEDURE — 82248 BILIRUBIN DIRECT: CPT | Performed by: INTERNAL MEDICINE

## 2019-03-29 PROCEDURE — 82962 GLUCOSE BLOOD TEST: CPT

## 2019-03-29 PROCEDURE — 85027 COMPLETE CBC AUTOMATED: CPT | Performed by: INTERNAL MEDICINE

## 2019-03-29 PROCEDURE — 74181 MRI ABDOMEN W/O CONTRAST: CPT

## 2019-03-29 PROCEDURE — 97166 OT EVAL MOD COMPLEX 45 MIN: CPT

## 2019-03-29 PROCEDURE — 25010000002 PIPERACILLIN SOD-TAZOBACTAM PER 1 G: Performed by: NURSE PRACTITIONER

## 2019-03-29 PROCEDURE — 80053 COMPREHEN METABOLIC PANEL: CPT | Performed by: INTERNAL MEDICINE

## 2019-03-29 PROCEDURE — 25010000002 HEPARIN (PORCINE) PER 1000 UNITS: Performed by: NURSE PRACTITIONER

## 2019-03-29 PROCEDURE — 25010000002 PIPERACILLIN SOD-TAZOBACTAM PER 1 G: Performed by: SURGERY

## 2019-03-29 PROCEDURE — 99239 HOSP IP/OBS DSCHRG MGMT >30: CPT | Performed by: INTERNAL MEDICINE

## 2019-03-29 RX ORDER — AMOXICILLIN AND CLAVULANATE POTASSIUM 875; 125 MG/1; MG/1
1 TABLET, FILM COATED ORAL 2 TIMES DAILY
Qty: 10 TABLET | Refills: 0 | Status: SHIPPED | OUTPATIENT
Start: 2019-03-29

## 2019-03-29 RX ORDER — HYDROCODONE BITARTRATE AND ACETAMINOPHEN 5; 325 MG/1; MG/1
1 TABLET ORAL EVERY 6 HOURS PRN
Qty: 12 TABLET | Refills: 0 | Status: CANCELLED | OUTPATIENT
Start: 2019-03-29

## 2019-03-29 RX ORDER — HYDROCODONE BITARTRATE AND ACETAMINOPHEN 5; 325 MG/1; MG/1
1 TABLET ORAL EVERY 6 HOURS PRN
Qty: 12 TABLET | Refills: 0 | Status: SHIPPED | OUTPATIENT
Start: 2019-03-29

## 2019-03-29 RX ADMIN — MELATONIN TAB 5 MG 5 MG: 5 TAB at 02:31

## 2019-03-29 RX ADMIN — HEPARIN SODIUM 5000 UNITS: 5000 INJECTION INTRAVENOUS; SUBCUTANEOUS at 05:51

## 2019-03-29 RX ADMIN — TAZOBACTAM SODIUM AND PIPERACILLIN SODIUM 3.38 G: 375; 3 INJECTION, SOLUTION INTRAVENOUS at 05:51

## 2019-03-29 RX ADMIN — MEMANTINE 10 MG: 10 TABLET ORAL at 08:51

## 2019-03-29 RX ADMIN — SODIUM CHLORIDE, PRESERVATIVE FREE 3 ML: 5 INJECTION INTRAVENOUS at 08:52

## 2019-03-29 RX ADMIN — CETIRIZINE HYDROCHLORIDE 10 MG: 10 TABLET, FILM COATED ORAL at 08:51

## 2019-03-29 RX ADMIN — TAZOBACTAM SODIUM AND PIPERACILLIN SODIUM 3.38 G: 375; 3 INJECTION, SOLUTION INTRAVENOUS at 14:00

## 2019-03-29 RX ADMIN — INSULIN LISPRO 2 UNITS: 100 INJECTION, SOLUTION INTRAVENOUS; SUBCUTANEOUS at 08:51

## 2019-03-29 RX ADMIN — HEPARIN SODIUM 5000 UNITS: 5000 INJECTION INTRAVENOUS; SUBCUTANEOUS at 14:00

## 2019-03-30 LAB — BACTERIA SPEC AEROBE CULT: ABNORMAL

## 2019-04-01 LAB
BACTERIA BLD CULT: ABNORMAL
BACTERIA SPEC AEROBE CULT: NORMAL

## 2019-04-05 LAB
BACTERIA SPEC AEROBE CULT: ABNORMAL
GRAM STN SPEC: ABNORMAL
ISOLATED FROM: ABNORMAL

## 2019-05-21 RX ORDER — SYRINGE,NEEDLE,INSULN,SAFE,1ML 30 G X1/2"
SYRINGE, EMPTY DISPOSABLE MISCELLANEOUS
Qty: 100 EACH | Refills: 0 | Status: SHIPPED | OUTPATIENT
Start: 2019-05-21

## 2019-07-03 RX ORDER — MEMANTINE HYDROCHLORIDE 10 MG/1
TABLET ORAL
Qty: 60 TABLET | Refills: 0 | Status: SHIPPED | OUTPATIENT
Start: 2019-07-03 | End: 2019-08-27 | Stop reason: SDUPTHER

## 2019-07-08 RX ORDER — SERTRALINE HYDROCHLORIDE 100 MG/1
TABLET, FILM COATED ORAL
Qty: 45 TABLET | Refills: 0 | Status: SHIPPED | OUTPATIENT
Start: 2019-07-08

## 2019-07-09 RX ORDER — SERTRALINE HYDROCHLORIDE 100 MG/1
TABLET, FILM COATED ORAL
Qty: 45 TABLET | Refills: 7 | Status: SHIPPED | OUTPATIENT
Start: 2019-07-09 | End: 2020-04-08

## 2019-07-09 RX ORDER — BLOOD-GLUCOSE METER
KIT MISCELLANEOUS
Refills: 5 | OUTPATIENT
Start: 2019-07-09

## 2019-08-27 RX ORDER — MEMANTINE HYDROCHLORIDE 10 MG/1
TABLET ORAL
Qty: 60 TABLET | Refills: 0 | Status: SHIPPED | OUTPATIENT
Start: 2019-08-27 | End: 2019-10-22 | Stop reason: SDUPTHER

## 2019-10-22 RX ORDER — MEMANTINE HYDROCHLORIDE 10 MG/1
TABLET ORAL
Qty: 60 TABLET | Refills: 0 | Status: SHIPPED | OUTPATIENT
Start: 2019-10-22 | End: 2020-01-14

## 2019-10-25 ENCOUNTER — OFFICE VISIT (OUTPATIENT)
Dept: NEUROLOGY | Facility: CLINIC | Age: 79
End: 2019-10-25

## 2019-10-25 VITALS — WEIGHT: 134 LBS | BODY MASS INDEX: 22.33 KG/M2 | HEIGHT: 65 IN

## 2019-10-25 DIAGNOSIS — G30.1 LATE ONSET ALZHEIMER'S DISEASE WITHOUT BEHAVIORAL DISTURBANCE (HCC): Primary | ICD-10-CM

## 2019-10-25 DIAGNOSIS — F02.80 LATE ONSET ALZHEIMER'S DISEASE WITHOUT BEHAVIORAL DISTURBANCE (HCC): Primary | ICD-10-CM

## 2019-10-25 PROCEDURE — 99214 OFFICE O/P EST MOD 30 MIN: CPT | Performed by: PHYSICIAN ASSISTANT

## 2019-10-25 NOTE — PROGRESS NOTES
Subjective     Chief Complaint: memory loss      History of Present Illness   Pt originally seen 6/14 for memory loss of about a year. Not noticed by patient. Dtr reported trouble with judgment, short term memory asking repetitive questions and making repetitive statements. Forgetting to pay bills --dtr had to take over. Lost a valuable check and didn't remember she'd received it. Also more trouble with learning how to use new appliances, organizing and planning, taking meds correctly (dtr now supervising meds, particularly insulin). Trouble finding her way back to table at a restaurant. Doesn't cook much -- eats out. Uses microwave.  Personality more withdrawn.    Normal B12, folate, TFTs, CT head. Started donepezil, then further worsening, had to stop driving, added Namenda and Lexapro. Noted need for 24 hr supervision and moved to [facility].  Nan reported dhe feels essentially unchanged cognitively and her daughter agreed. Her daughter continues to note that she is picking at her skin. She has also developed urinary and fecal incontinence. On 10mg daily, Namenda, and Lexapro 10mg daily. MMSE was 18/30, maybe influenced by visual impairment 2/2 macular degeneration. Planned increase of sertraline for picking at sores.   12/16: dtr notes continued problems with memory, judgment, WF, planning/organizing and orientation, but no difficulty with delusions or hallucinations. Also notes pt having more difficulty walking, more shuffling, and balance not as good.  Pt reports feeling fine.  Picking at sores again -- increased dose worked well until past month or two. Sleeping more past few months. Continued problems with bowel incontinence. Planned PT for balance, and increase Zoloft for compulsive picking. Saw Kellen Du 6/17, no changes noted, except MMSE 13/30, continued memantine and zoloft.   3/18: Patient reports she is doing well and has no complaints.  Her daughter notes she continues to have difficulty with  "memory, judgment, word finding, learning, planning and organizing.  She recently moved to the memory care unit at [facility] and is doing well with the extra interaction and assistance.  Her daughter notes she seems to be prospering with more social interaction.  She is also using a walker now as her balance had continued to worsen despite PT.  Her daughter was concerned recently when the physician at [facility] planned to discontinue her donepezil due to intermittent diarrhea.  However her daughter notes that she's had problems with this off and on for decades, perhaps since starting metformin many years ago.  This has been tapered by Dr. Waller and she seems to be doing better.    She continues to pick at her skin, recently complicated by infection. However with more monitoring in the memory care unit she seems to be doing better with this as well.      Today: Since her last visit in 10/18, she feels essentially unchanged cognitively and her family agrees. Her daughter noted increased balance impairment and shuffling as well as increased somnolence.      I have reviewed and confirmed the past family, social and medical history as accurate on 10/25/19.     Review of Systems   Constitutional: Negative.    HENT: Negative.    Eyes: Negative.    Respiratory: Negative.    Cardiovascular: Negative.    Gastrointestinal: Negative.    Endocrine: Negative.    Genitourinary: Negative.    Musculoskeletal: Negative.    Skin: Negative.    Allergic/Immunologic: Negative.    Hematological: Negative.    Psychiatric/Behavioral: Negative.        Objective     Ht 165.1 cm (65\")   Wt 60.8 kg (134 lb)   BMI 22.30 kg/m²     General appearance today is normal.       Physical Exam   Neurological: She has normal strength. She has a normal Finger-Nose-Finger Test.   Psychiatric: Her speech is normal.        Neurologic Exam     Mental Status   Oriented to person.   Oriented to place.   Disoriented to time.   Registration: recalls 3 " of 3 objects. Recall of objects at 5 minutes: 0/3 objects. Follows 3 step commands.   Attention: decreased.   Speech: speech is normal   Level of consciousness: alert  Able to name object. Unable to read. Able to repeat. Unable to write. Normal comprehension.     Cranial Nerves   Cranial nerves II through XII intact.     Motor Exam   Muscle bulk: normal  Overall muscle tone: normal    Strength   Strength 5/5 throughout.     Sensory Exam   Light touch normal.     Gait, Coordination, and Reflexes     Gait  Gait: shuffling    Coordination   Finger to nose coordination: normal    Tremor   Resting tremor: absent        Results  MMSE=12 (limited due to vision impairment)       Assessment/Plan   Faustino was seen today for alzheimer's disease.    Diagnoses and all orders for this visit:    Late onset Alzheimer's disease without behavioral disturbance (CMS/HCC)          Discussion/Summary   Faustino Lopez returns to clinic today for evaluation of Alzheimer's Disease . I again reviewed her current status and treatment options. After discussing potential treatment options, it was elected to continue on her current medications unchanged. She is scheduled to begin PT with home health in the near future for her balance.  She will then follow up in 6 months , or sooner if needed.   I spent 25 minutes face to face with the patient and family with 15 minutes spent on discussing diagnosis, prognosis, evaluation, current status, treatment options and management as discussed above.       As part of this visit I obtained additional history from the family which is incorporated in the HPI.      Kellen Sandy PA-C

## 2020-01-14 RX ORDER — MEMANTINE HYDROCHLORIDE 10 MG/1
TABLET ORAL
Qty: 60 TABLET | Refills: 0 | Status: SHIPPED | OUTPATIENT
Start: 2020-01-14 | End: 2020-03-11

## 2020-01-14 RX ORDER — SYRINGE,NEEDLE,INSULN,SAFE,1ML 31 GX5/16"
SYRINGE, EMPTY DISPOSABLE MISCELLANEOUS
Qty: 100 EACH | Refills: 0 | Status: SHIPPED | OUTPATIENT
Start: 2020-01-14

## 2020-03-11 RX ORDER — MEMANTINE HYDROCHLORIDE 10 MG/1
TABLET ORAL
Qty: 60 TABLET | Refills: 0 | Status: SHIPPED | OUTPATIENT
Start: 2020-03-11 | End: 2020-04-27

## 2020-03-23 ENCOUNTER — TELEPHONE (OUTPATIENT)
Dept: NEUROLOGY | Facility: CLINIC | Age: 80
End: 2020-03-23

## 2020-03-23 NOTE — TELEPHONE ENCOUNTER
Letting us know that patient was accepted for home health as she's had 3 falls last week. PT/OT will be providing services.

## 2020-04-08 RX ORDER — SERTRALINE HYDROCHLORIDE 100 MG/1
TABLET, FILM COATED ORAL
Qty: 42 TABLET | Refills: 7 | Status: SHIPPED | OUTPATIENT
Start: 2020-04-08

## 2020-04-09 ENCOUNTER — TELEPHONE (OUTPATIENT)
Dept: ENDOCRINOLOGY | Facility: CLINIC | Age: 80
End: 2020-04-09

## 2020-04-27 RX ORDER — MEMANTINE HYDROCHLORIDE 10 MG/1
TABLET ORAL
Qty: 60 TABLET | Refills: 0 | Status: SHIPPED | OUTPATIENT
Start: 2020-04-27

## 2020-11-07 ENCOUNTER — APPOINTMENT (OUTPATIENT)
Dept: CT IMAGING | Facility: HOSPITAL | Age: 80
End: 2020-11-07

## 2020-11-07 ENCOUNTER — HOSPITAL ENCOUNTER (EMERGENCY)
Facility: HOSPITAL | Age: 80
Discharge: NURSING, REHAB OR LONG-TERM CARE | End: 2020-11-07
Attending: EMERGENCY MEDICINE | Admitting: EMERGENCY MEDICINE

## 2020-11-07 ENCOUNTER — APPOINTMENT (OUTPATIENT)
Dept: GENERAL RADIOLOGY | Facility: HOSPITAL | Age: 80
End: 2020-11-07

## 2020-11-07 VITALS
HEIGHT: 65 IN | DIASTOLIC BLOOD PRESSURE: 51 MMHG | BODY MASS INDEX: 22.33 KG/M2 | WEIGHT: 134 LBS | HEART RATE: 78 BPM | TEMPERATURE: 98.4 F | OXYGEN SATURATION: 99 % | RESPIRATION RATE: 14 BRPM | SYSTOLIC BLOOD PRESSURE: 140 MMHG
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
## 2020-11-07 DIAGNOSIS — S00.03XA HEMATOMA OF SCALP, INITIAL ENCOUNTER: ICD-10-CM

## 2020-11-07 DIAGNOSIS — S06.0X0A CONCUSSION WITHOUT LOSS OF CONSCIOUSNESS, INITIAL ENCOUNTER: Primary | ICD-10-CM

## 2020-11-07 PROCEDURE — 73030 X-RAY EXAM OF SHOULDER: CPT

## 2020-11-07 PROCEDURE — 72125 CT NECK SPINE W/O DYE: CPT

## 2020-11-07 PROCEDURE — 70450 CT HEAD/BRAIN W/O DYE: CPT

## 2020-11-07 PROCEDURE — 99283 EMERGENCY DEPT VISIT LOW MDM: CPT

## 2020-11-07 NOTE — ED PROVIDER NOTES
EMERGENCY DEPARTMENT ENCOUNTER      Pt Name: Faustino Lopez  MRN: [id number]  YOB: 1940  Date of evaluation: 11/7/2020  Provider: Wild Welch MD    CHIEF COMPLAINT       Chief Complaint   Patient presents with   • Fall         HISTORY OF PRESENT ILLNESS  (Location/Symptom, Timing/Onset, Context/Setting, Quality, Duration, Modifying Factors, Severity.)   Faustino Lopez is a 80 y.o. female who presents to the emergency department after falling out of bed this morning while trying to get up to go the bathroom.  Patient accidentally slipped out of bed when she was attempting to get up, striking her left forehead on the floor.  She states that she did not lose consciousness and is not complaining of anything at this point.  When specifically asked, she denies any headache, neck pain, chest pain, shortness of breath, back pain, abdominal pain, or extremity pain.  She is not on any anticoagulant medications.      Nursing notes were reviewed.    REVIEW OF SYSTEMS    (2-9 systems for level 4, 10 or more for level 5)   ROS:  General:  No fevers, no chills, no weakness  Cardiovascular:  No chest pain, no palpitations  Respiratory:  No shortness of breath, no cough, no wheezing  Gastrointestinal:  No pain, no nausea, no vomiting, no diarrhea  Musculoskeletal:  No muscle pain, no joint pain  Skin: Scalp hematoma  Neurologic:  No speech problems, no headache, no extremity numbness, no extremity tingling, no extremity weakness  Psychiatric:  No anxiety  Genitourinary:  No dysuria, no hematuria    Except as noted above the remainder of the review of systems was reviewed and negative.       PAST MEDICAL HISTORY     Past Medical History:   Diagnosis Date   • Alzheimer disease (CMS/HCC)    • Benign colonic polyp    • Dementia (CMS/HCC)     MEDICATION IN CHART FOR BUT PT STATES SHE DOESN'T KNOW OF BEING DXN   • Diabetes mellitus (CMS/HCC)    • Diarrhea      03 Jul 2014  refer GI[facility] immodiumsome incontinence,  "nocturnal diarrhea   • History of migraine    • Hypertension    • Leg cramps    • Macular degeneration    • Vitamin D deficiency     13 Apr 2015  update vitamin D levels         SURGICAL HISTORY       Past Surgical History:   Procedure Laterality Date   • NO PAST SURGERIES      PT DENIES PAST SURGERIES   • OTHER SURGICAL HISTORY      Dental surgery         CURRENT MEDICATIONS     No current facility-administered medications for this encounter.     Current Outpatient Medications:   •  ALAWAY 0.025 % ophthalmic solution, , Disp: , Rfl: 4  •  amoxicillin-clavulanate (AUGMENTIN) 875-125 MG per tablet, Take 1 tablet by mouth 2 (Two) Times a Day., Disp: 10 tablet, Rfl: 0  •  ANTI-DIARRHEAL 2 MG tablet, Take 1 tablet by mouth 3 (Three) Times a Day As Needed., Disp: , Rfl: 5  •  atorvastatin (LIPITOR) 40 MG tablet, , Disp: , Rfl:   •  BD AUTOSHIELD DUO 30G X 5 MM misc, , Disp: , Rfl: 0  •  Cholecalciferol (VITAMIN D3) 2000 UNITS tablet, Take 1 tablet by mouth., Disp: , Rfl:   •  Cinnamon 500 MG capsule, Take 2 capsules by mouth 2 (Two) Times a Day., Disp: , Rfl: 6  •  clotrimazole-betamethasone (LOTRISONE) 1-0.05 % cream, , Disp: , Rfl:   •  donepezil (ARICEPT) 10 MG tablet, Take  by mouth., Disp: , Rfl:   •  EASY TOUCH FLIPLOCK INSULIN SY 31G X 5/16\" 1 ML misc, USE TO INJECT INSULIN FOUR TIMES A DAY OR AS DIRECTED, Disp: 100 each, Rfl: 0  •  EASY TOUCH INSULIN SAFETY SYR 30G X 1/2\" 1 ML misc, INJECT WITH INSULIN FOUR TIMES A DAY OR AS DIRECTED, Disp: 100 each, Rfl: 0  •  glimepiride (AMARYL) 4 MG tablet, Take  by mouth 2 (two) times a day., Disp: , Rfl:   •  HUMALOG 100 UNIT/ML injection, INJECT 16U SUBCUTANEOUSLY WITH BREAKFAST 18 WITH LUNCH AND 26 WITH DINNER*DISCARD 28 DAYS AFTER OPENING*, Disp: 10 mL, Rfl: 0  •  HYDROcodone-acetaminophen (NORCO) 5-325 MG per tablet, Take 1 tablet by mouth Every 6 (Six) Hours As Needed for Moderate Pain ., Disp: 12 tablet, Rfl: 0  •  Insulin Glargine (LANTUS SOLOSTAR) 100 UNIT/ML " injection pen, Inject 16 Units under the skin Every Night., Disp: , Rfl:   •  levothyroxine (SYNTHROID, LEVOTHROID) 25 MCG tablet, , Disp: , Rfl:   •  lisinopril (PRINIVIL,ZESTRIL) 5 MG tablet, Take  by mouth daily., Disp: , Rfl:   •  loratadine (CLARITIN) 10 MG tablet, Take  by mouth., Disp: , Rfl:   •  LORazepam (ATIVAN) 1 MG tablet, , Disp: , Rfl: 0  •  memantine (NAMENDA) 10 MG tablet, TAKE ONE TABLET BY MOUTH TWICE A DAY, Disp: 60 tablet, Rfl: 0  •  Menthol-Zinc Oxide (CALMOSEPTINE) 0.44-20.6 % ointment, Apply  topically 2 (Two) Times a Day., Disp: , Rfl:   •  metFORMIN (GLUCOPHAGE) 1000 MG tablet, Take 1 tablet by mouth 2 (Two) Times a Day., Disp: 60 tablet, Rfl: 11  •  Multiple Vitamins-Minerals (PRESERVISION AREDS 2) capsule, Take  by mouth., Disp: , Rfl:   •  neomycin-bacitracin-polymyxin b (NEOSPORIN) 3.5-400-5000 ointment ointment, , Disp: , Rfl: 0  •  NON-ASPIRIN PAIN RELIEF 325 MG tablet, Take 2 tablets by mouth 4 (Four) Times a Day As Needed., Disp: , Rfl: 5  •  Omega-3 Fatty Acids (FISH OIL) 1000 MG capsule capsule, Take  by mouth., Disp: , Rfl:   •  ondansetron (ZOFRAN) 4 MG tablet, , Disp: , Rfl: 0  •  pseudoephedrine (SUDAFED) 30 MG tablet, Take 30 mg by mouth as needed.  , Disp: , Rfl:   •  pseudoephedrine (SUDAFED) 30 MG tablet, Take 30 mg by mouth., Disp: , Rfl:   •  saccharomyces boulardii (FLORASTOR) 250 MG capsule, Take 1 capsule by mouth daily., Disp: , Rfl:   •  sertraline (ZOLOFT) 100 MG tablet, TAKE 1&1/2 TABLETS BY MOUTH AT BEDTIME, Disp: 45 tablet, Rfl: 0  •  sertraline (ZOLOFT) 100 MG tablet, TAKE 1&1/2 TABLETS BY MOUTH AT BEDTIME, Disp: 42 tablet, Rfl: 7    ALLERGIES     Citrus, Erythromycin, Tomato, and Vancomycin    FAMILY HISTORY       Family History   Problem Relation Age of Onset   • Diabetes Father    • Migraines Father         Migraine headaches   • Diabetes Daughter    • Hyperlipidemia Daughter           SOCIAL HISTORY       Social History     Socioeconomic History   • Marital  status:      Spouse name: Not on file   • Number of children: Not on file   • Years of education: Not on file   • Highest education level: Not on file   Tobacco Use   • Smoking status: Never Smoker   • Smokeless tobacco: Never Used   Substance and Sexual Activity   • Alcohol use: No   • Drug use: No   • Sexual activity: Defer         PHYSICAL EXAM    (up to 7 for level 4, 8 or more for level 5)     Vitals:    11/07/20 0400 11/07/20 0520 11/07/20 0527 11/07/20 0537   BP: 143/63      BP Location:       Patient Position:       Pulse: 75 79 80 76   Resp:       Temp:       TempSrc:       SpO2: 98% 98% 100% 99%   Weight:       Height:           Physical Exam  General: Awake, alert, no acute distress.  HEENT: Pupils are equally round and reactive to light, EOMI, conjunctivae clear, sclerae white, there is no injection no icterus.  Oral mucosa is moist, no exudate. Uvula is midline. No malocclusion or tenderness over the mandible. There is no hemotympanum, castro sign, or raccoon eyes.  There is a 3 cm left frontal hematoma  Neck: Neck is supple, full range of motion, trachea midline. No midline tenderness.  Cardiac: Heart regular rate, rhythm, no murmurs, rubs, or gallops. Peripheral pulses are 2+ throughout.  Lungs: Lungs are clear to auscultation, there is no wheezing, rhonchi, or rales. There is no use of accessory muscles.  Chest wall: There is no tenderness to palpation over the chest wall or over ribs. There are no chest wall ecchymoses.  Abdomen: Abdomen is soft, nontender, nondistended. There are no firm or pulsatile masses, no rebound rigidity or guarding. No abdominal wall ecchymoses.  Musculoskeletal: No deformity or focal bone tenderness.  Mild pain with range of motion of the right shoulder.  Neuro: Motor intact, sensory intact, level of consciousness is normal.  Dermatology: Skin is warm and dry  Psych: Mentation is grossly normal, cognition is grossly normal. Affect is appropriate.        DIAGNOSTIC  RESULTS     RADIOLOGY:   Non-plain film images such as CT, Ultrasound and MRI are read by the radiologist. Plain radiographic images are visualized and preliminarily interpreted by the emergency physician with the below findings:      [x] Radiologist's Report Reviewed:  XR Shoulder 2+ View Right   Final Result      1. Chronic appearing deformity of the proximal humerus. No acute appearing fracture. Osteoporosis.      Signer Name: Fritz Wang MD    Signed: 11/7/2020 5:38 AM    Workstation Name: [facility]      CT Head Without Contrast   Final Result   Senescent changes without acute abnormality.               Signer Name: Fritz Wang MD    Signed: 11/7/2020 5:31 AM    Workstation Name: [facility]      CT Cervical Spine Without Contrast   Final Result      1. No acute fracture or malalignment. Degenerative changes.      Signer Name: Fritz Wang MD    Signed: 11/7/2020 5:36 AM    Workstation Name: [facility]                EMERGENCY DEPARTMENT COURSE and DIFFERENTIAL DIAGNOSIS/MDM:   Vitals:    Vitals:    11/07/20 0400 11/07/20 0520 11/07/20 0527 11/07/20 0537   BP: 143/63      BP Location:       Patient Position:       Pulse: 75 79 80 76   Resp:       Temp:       TempSrc:       SpO2: 98% 98% 100% 99%   Weight:       Height:           ED Course as of Nov 07 0551   Sat Nov 07, 2020   0545 Right shoulder x-ray personally reviewed and there is no evidence of acute pathology.      [NS]   0545 Results reviewed and are negative.  I have reevaluated the patient and she is well-appearing with no deficits or complaints at this time.    [NS]      ED Course User Index  [NS] Wild Welch MD       Patient presents with history consistent with mechanical fall out of bed this morning.  She does have left frontal hematoma but is otherwise well-appearing with benign examination.  CT head and C-spine  are negative for any acute traumatic pathology and there is no clinical evidence of basilar skull fracture.  She did have some mild pain when I ranged her right shoulder, however x-ray of this area is negative and per report from the patient's daughter who is at the bedside she chronically has pain and stiffness in the shoulder secondary to previous surgery.  Based on complete history and physical examination, there is no evidence of other traumatic injury.    I had a discussion with the patient/family regarding diagnosis, diagnostic results, treatment plan, and medications.  The patient/family indicated understanding of these instructions.  I spent adequate time at the bedside proceeding discharge necessary to personally discuss the aftercare instructions, giving patient education, providing explanations of the results of our evaluations/findings, and my decision making to assure that the patient/family understand the plan of care.  Time was allotted to answer questions at that time and throughout the ED course.  Emphasis was placed on timely follow-up after discharge.  I also discussed the potential for the development of an acute emergent condition requiring further evaluation, admission, or even surgical intervention. I discussed that we found nothing during the visit today indicating the need for further workup, admission, or the presence of an unstable medical condition.  I encouraged the patient to return to the emergency department immediately for ANY concerns, worsening, new complaints, or if symptoms persist and unable to seek follow-up in a timely fashion.  The patient/family expressed understanding and agreement with this plan.  The patient will follow-up with their PCP in 1-2 days for reevaluation.           FINAL IMPRESSION      1. Concussion without loss of consciousness, initial encounter    2. Hematoma of scalp, initial encounter    3. Fall, initial encounter          DISPOSITION/PLAN     ED  "Disposition     ED Disposition Condition Comment    Discharge Stable           PATIENT REFERRED TO:  Kesha Waller MD  [address]  [facility] [id number] [phone]    In 1 week      [facility] Emergency Department  [address] [address] [phone]    If symptoms worsen      DISCHARGE MEDICATIONS:     Medication List      CONTINUE taking these medications    Alaway 0.025 % ophthalmic solution  Generic drug: ketotifen     amoxicillin-clavulanate 875-125 MG per tablet  Commonly known as: Augmentin  Take 1 tablet by mouth 2 (Two) Times a Day.     Anti-Diarrheal 2 MG tablet  Generic drug: loperamide     atorvastatin 40 MG tablet  Commonly known as: LIPITOR     BD AutoShield Duo 30G X 5 MM misc  Generic drug: Insulin Pen Needle     Calmoseptine 0.44-20.6 % ointment  Generic drug: Menthol-Zinc Oxide     Cinnamon 500 MG capsule     Claritin 10 MG tablet  Generic drug: loratadine     clotrimazole-betamethasone 1-0.05 % cream  Commonly known as: LOTRISONE     donepezil 10 MG tablet  Commonly known as: ARICEPT     * Easy Touch Insulin Safety Syr 30G X 1/2\" 1 ML misc  Generic drug: Insulin Syringe-Needle U-100  INJECT WITH INSULIN FOUR TIMES A DAY OR AS DIRECTED     * Easy Touch FlipLock Insulin Sy 31G X 5/16\" 1 ML misc  Generic drug: Insulin Syringe-Needle U-100  USE TO INJECT INSULIN FOUR TIMES A DAY OR AS DIRECTED     fish oil 1000 MG capsule capsule     Florastor 250 MG capsule  Generic drug: saccharomyces boulardii     glimepiride 4 MG tablet  Commonly known as: AMARYL     HumaLOG 100 UNIT/ML injection  Generic drug: insulin lispro  INJECT 16U SUBCUTANEOUSLY WITH BREAKFAST 18 WITH LUNCH AND 26 WITH DINNER*DISCARD 28 DAYS AFTER OPENING*     HYDROcodone-acetaminophen 5-325 MG per tablet  Commonly known as: NORCO  Take 1 tablet by mouth Every 6 (Six) Hours As Needed for Moderate Pain .     Lantus SoloStar 100 UNIT/ML injection pen  Generic drug: " Insulin Glargine     levothyroxine 25 MCG tablet  Commonly known as: SYNTHROID, LEVOTHROID     lisinopril 5 MG tablet  Commonly known as: PRINIVIL,ZESTRIL     LORazepam 1 MG tablet  Commonly known as: ATIVAN     memantine 10 MG tablet  Commonly known as: NAMENDA  TAKE ONE TABLET BY MOUTH TWICE A DAY     metFORMIN 1000 MG tablet  Commonly known as: GLUCOPHAGE  Take 1 tablet by mouth 2 (Two) Times a Day.     multivitamins-minerals capsule capsule     neomycin-bacitracin-polymyxin b 3.5-400-5000 ointment ointment     Non-Aspirin Pain Relief 325 MG tablet  Generic drug: acetaminophen     ondansetron 4 MG tablet  Commonly known as: ZOFRAN     * pseudoephedrine 30 MG tablet  Commonly known as: SUDAFED     * pseudoephedrine 30 MG tablet  Commonly known as: SUDAFED     * sertraline 100 MG tablet  Commonly known as: ZOLOFT  TAKE 1&1/2 TABLETS BY MOUTH AT BEDTIME     * sertraline 100 MG tablet  Commonly known as: ZOLOFT  TAKE 1&1/2 TABLETS BY MOUTH AT BEDTIME     Vitamin D3 50 MCG (2000 UT) tablet         * This list has 6 medication(s) that are the same as other medications prescribed for you. Read the directions carefully, and ask your doctor or other care provider to review them with you.                    Comment: Please note this report has been produced using speech recognition software.      Wild Welch MD  Attending Emergency Physician               Wild Welch MD  11/07/20 0510

## 2021-07-08 ENCOUNTER — TELEPHONE (OUTPATIENT)
Dept: ENDOCRINOLOGY | Facility: CLINIC | Age: 81
End: 2021-07-08

## 2021-07-08 NOTE — TELEPHONE ENCOUNTER
I would reduce pre lunch insulin   Also if you can get an updated medication list that would be helpful  Thanks, hilton